# Patient Record
Sex: FEMALE | Race: WHITE | NOT HISPANIC OR LATINO | Employment: FULL TIME | URBAN - METROPOLITAN AREA
[De-identification: names, ages, dates, MRNs, and addresses within clinical notes are randomized per-mention and may not be internally consistent; named-entity substitution may affect disease eponyms.]

---

## 2022-02-18 DIAGNOSIS — N63.14 UNSPECIFIED LUMP IN THE RIGHT BREAST, LOWER INNER QUADRANT: Primary | ICD-10-CM

## 2022-02-18 DIAGNOSIS — N63.23 UNSPECIFIED LUMP IN THE LEFT BREAST, LOWER OUTER QUADRANT: ICD-10-CM

## 2022-02-18 DIAGNOSIS — N64.59 OTHER SIGNS AND SYMPTOMS IN BREAST: ICD-10-CM

## 2022-03-07 ENCOUNTER — HOSPITAL ENCOUNTER (OUTPATIENT)
Dept: RADIOLOGY | Facility: HOSPITAL | Age: 37
Discharge: HOME | End: 2022-03-07
Attending: OBSTETRICS & GYNECOLOGY
Payer: COMMERCIAL

## 2022-03-07 DIAGNOSIS — N63.23 UNSPECIFIED LUMP IN THE LEFT BREAST, LOWER OUTER QUADRANT: ICD-10-CM

## 2022-03-07 DIAGNOSIS — N63.14 UNSPECIFIED LUMP IN THE RIGHT BREAST, LOWER INNER QUADRANT: ICD-10-CM

## 2022-03-07 DIAGNOSIS — N64.59 OTHER SIGNS AND SYMPTOMS IN BREAST: ICD-10-CM

## 2022-03-07 PROCEDURE — 77062 BREAST TOMOSYNTHESIS BI: CPT | Mod: TC,PO

## 2022-03-07 PROCEDURE — 76642 ULTRASOUND BREAST LIMITED: CPT | Mod: TC,50,PO

## 2022-05-02 ENCOUNTER — OFFICE VISIT (OUTPATIENT)
Dept: PRIMARY CARE CLINIC | Facility: CLINIC | Age: 37
End: 2022-05-02
Payer: COMMERCIAL

## 2022-05-02 ENCOUNTER — TELEPHONE (OUTPATIENT)
Dept: PRIMARY CARE CLINIC | Facility: CLINIC | Age: 37
End: 2022-05-02
Payer: COMMERCIAL

## 2022-05-02 VITALS
WEIGHT: 131.38 LBS | BODY MASS INDEX: 26.48 KG/M2 | HEIGHT: 59 IN | HEART RATE: 92 BPM | SYSTOLIC BLOOD PRESSURE: 124 MMHG | DIASTOLIC BLOOD PRESSURE: 76 MMHG | RESPIRATION RATE: 18 BRPM | OXYGEN SATURATION: 100 %

## 2022-05-02 DIAGNOSIS — I10 ESSENTIAL HYPERTENSION, BENIGN: Primary | ICD-10-CM

## 2022-05-02 DIAGNOSIS — E28.2 PCOS (POLYCYSTIC OVARIAN SYNDROME): ICD-10-CM

## 2022-05-02 DIAGNOSIS — F17.200 VAPING NICOTINE DEPENDENCE, NON-TOBACCO PRODUCT: ICD-10-CM

## 2022-05-02 DIAGNOSIS — J30.2 SEASONAL ALLERGIES: ICD-10-CM

## 2022-05-02 DIAGNOSIS — F41.9 ANXIETY: ICD-10-CM

## 2022-05-02 DIAGNOSIS — Z23 NEED FOR VACCINATION: ICD-10-CM

## 2022-05-02 PROCEDURE — 1159F MED LIST DOCD IN RCRD: CPT | Mod: CPTII,S$GLB,, | Performed by: FAMILY MEDICINE

## 2022-05-02 PROCEDURE — 99204 OFFICE O/P NEW MOD 45 MIN: CPT | Mod: 25,S$GLB,, | Performed by: FAMILY MEDICINE

## 2022-05-02 PROCEDURE — 3008F PR BODY MASS INDEX (BMI) DOCUMENTED: ICD-10-PCS | Mod: CPTII,S$GLB,, | Performed by: FAMILY MEDICINE

## 2022-05-02 PROCEDURE — 1160F RVW MEDS BY RX/DR IN RCRD: CPT | Mod: CPTII,S$GLB,, | Performed by: FAMILY MEDICINE

## 2022-05-02 PROCEDURE — 99999 PR PBB SHADOW E&M-EST. PATIENT-LVL III: ICD-10-PCS | Mod: PBBFAC,,, | Performed by: FAMILY MEDICINE

## 2022-05-02 PROCEDURE — 1160F PR REVIEW ALL MEDS BY PRESCRIBER/CLIN PHARMACIST DOCUMENTED: ICD-10-PCS | Mod: CPTII,S$GLB,, | Performed by: FAMILY MEDICINE

## 2022-05-02 PROCEDURE — 4010F PR ACE/ARB THEARPY RXD/TAKEN: ICD-10-PCS | Mod: CPTII,S$GLB,, | Performed by: FAMILY MEDICINE

## 2022-05-02 PROCEDURE — 3008F BODY MASS INDEX DOCD: CPT | Mod: CPTII,S$GLB,, | Performed by: FAMILY MEDICINE

## 2022-05-02 PROCEDURE — 93010 ELECTROCARDIOGRAM REPORT: CPT | Mod: S$GLB,,, | Performed by: INTERNAL MEDICINE

## 2022-05-02 PROCEDURE — 93010 EKG 12-LEAD: ICD-10-PCS | Mod: S$GLB,,, | Performed by: INTERNAL MEDICINE

## 2022-05-02 PROCEDURE — 3078F PR MOST RECENT DIASTOLIC BLOOD PRESSURE < 80 MM HG: ICD-10-PCS | Mod: CPTII,S$GLB,, | Performed by: FAMILY MEDICINE

## 2022-05-02 PROCEDURE — 99204 PR OFFICE/OUTPT VISIT, NEW, LEVL IV, 45-59 MIN: ICD-10-PCS | Mod: 25,S$GLB,, | Performed by: FAMILY MEDICINE

## 2022-05-02 PROCEDURE — 4010F ACE/ARB THERAPY RXD/TAKEN: CPT | Mod: CPTII,S$GLB,, | Performed by: FAMILY MEDICINE

## 2022-05-02 PROCEDURE — 93005 EKG 12-LEAD: ICD-10-PCS | Mod: S$GLB,,, | Performed by: FAMILY MEDICINE

## 2022-05-02 PROCEDURE — 99999 PR PBB SHADOW E&M-EST. PATIENT-LVL III: CPT | Mod: PBBFAC,,, | Performed by: FAMILY MEDICINE

## 2022-05-02 PROCEDURE — 1159F PR MEDICATION LIST DOCUMENTED IN MEDICAL RECORD: ICD-10-PCS | Mod: CPTII,S$GLB,, | Performed by: FAMILY MEDICINE

## 2022-05-02 PROCEDURE — 3074F PR MOST RECENT SYSTOLIC BLOOD PRESSURE < 130 MM HG: ICD-10-PCS | Mod: CPTII,S$GLB,, | Performed by: FAMILY MEDICINE

## 2022-05-02 PROCEDURE — 93005 ELECTROCARDIOGRAM TRACING: CPT | Mod: S$GLB,,, | Performed by: FAMILY MEDICINE

## 2022-05-02 PROCEDURE — 90471 IMMUNIZATION ADMIN: CPT | Mod: S$GLB,,, | Performed by: FAMILY MEDICINE

## 2022-05-02 PROCEDURE — 90471 TDAP VACCINE GREATER THAN OR EQUAL TO 7YO IM: ICD-10-PCS | Mod: S$GLB,,, | Performed by: FAMILY MEDICINE

## 2022-05-02 PROCEDURE — 3078F DIAST BP <80 MM HG: CPT | Mod: CPTII,S$GLB,, | Performed by: FAMILY MEDICINE

## 2022-05-02 PROCEDURE — 90715 TDAP VACCINE GREATER THAN OR EQUAL TO 7YO IM: ICD-10-PCS | Mod: S$GLB,,, | Performed by: FAMILY MEDICINE

## 2022-05-02 PROCEDURE — 90715 TDAP VACCINE 7 YRS/> IM: CPT | Mod: S$GLB,,, | Performed by: FAMILY MEDICINE

## 2022-05-02 PROCEDURE — 3074F SYST BP LT 130 MM HG: CPT | Mod: CPTII,S$GLB,, | Performed by: FAMILY MEDICINE

## 2022-05-02 RX ORDER — ALPRAZOLAM 0.5 MG/1
0.5 TABLET ORAL DAILY PRN
COMMUNITY
Start: 2022-03-04

## 2022-05-02 RX ORDER — LISINOPRIL 5 MG/1
5 TABLET ORAL DAILY
Qty: 90 TABLET | Refills: 3 | Status: SHIPPED | OUTPATIENT
Start: 2022-05-02 | End: 2022-08-05

## 2022-05-02 RX ORDER — LISINOPRIL 5 MG/1
5 TABLET ORAL DAILY
COMMUNITY
Start: 2022-04-21 | End: 2022-05-02 | Stop reason: SDUPTHER

## 2022-05-02 NOTE — PROGRESS NOTES
Verified pt ID using name and . NKDA. Administered Tdap in left deltoid per physician order using aseptic technique. Aspirated and no blood return noted. Pt tolerated well with no adverse reactions noted.

## 2022-05-02 NOTE — PROGRESS NOTES
"Subjective:       Patient ID: Daniella Turner is a 37 y.o. female.    Chief Complaint: Establish Care and Hypertension    BP consistently elevated at GYN appointment earlier this year, so started on lisinopril and BP has been fine since. , uncomplicated pregnancy    Review of Systems   Constitutional: Negative for chills, fatigue and fever.   HENT: Negative for congestion.    Eyes: Negative for visual disturbance.   Respiratory: Negative for cough and shortness of breath.    Cardiovascular: Negative for chest pain.   Gastrointestinal: Negative for abdominal pain, nausea and vomiting.   Genitourinary: Negative for difficulty urinating.   Musculoskeletal: Negative for arthralgias.   Skin: Negative for rash.   Allergic/Immunologic: Positive for environmental allergies.   Neurological: Negative for dizziness.   Hematological: Does not bruise/bleed easily.   Psychiatric/Behavioral: Negative for sleep disturbance.       Objective:      Vitals:    22 1543   BP: 124/76   BP Location: Right arm   Patient Position: Sitting   BP Method: Medium (Manual)   Pulse: 92   Resp: 18   SpO2: 100%   Weight: 59.6 kg (131 lb 6.3 oz)   Height: 4' 11" (1.499 m)     Physical Exam  Vitals and nursing note reviewed.   Constitutional:       Appearance: She is well-developed.   HENT:      Head: Normocephalic and atraumatic.   Eyes:      Pupils: Pupils are equal, round, and reactive to light.   Neck:      Vascular: No carotid bruit or JVD.   Cardiovascular:      Rate and Rhythm: Normal rate and regular rhythm.      Pulses:           Radial pulses are 2+ on the right side and 2+ on the left side.      Heart sounds: Normal heart sounds.   Pulmonary:      Effort: Pulmonary effort is normal.      Breath sounds: Normal breath sounds.   Abdominal:      General: Bowel sounds are normal. There is no distension.      Palpations: Abdomen is soft.      Tenderness: There is no abdominal tenderness.   Musculoskeletal:      Cervical back: Neck " supple.      Right lower leg: No edema.      Left lower leg: No edema.   Skin:     General: Skin is warm and dry.   Neurological:      Mental Status: She is alert and oriented to person, place, and time.   Psychiatric:         Behavior: Behavior normal.         No results found for: WBC, HGB, HCT, PLT, CHOL, TRIG, HDL, LDLDIRECT, ALT, AST, NA, K, CL, CREATININE, BUN, CO2, TSH, PSA, INR, GLUF, HGBA1C, MICROALBUR   Assessment:       1. Essential hypertension, benign    2. Anxiety    3. Seasonal allergies    4. PCOS (polycystic ovarian syndrome)    5. Vaping nicotine dependence, non-tobacco product    6. Need for vaccination        Plan:       Essential hypertension, benign  -     EKG 12-lead  -     lisinopriL (PRINIVIL,ZESTRIL) 5 MG tablet; Take 1 tablet (5 mg total) by mouth once daily.  Dispense: 90 tablet; Refill: 3  EKG normal.  Continue current meds.  Release of information to get recent lab results from her OBGYN  Anxiety  Uses Xanax p.r.n.  Seasonal allergies    PCOS (polycystic ovarian syndrome)    Vaping nicotine dependence, non-tobacco product  About to get off completely  Need for vaccination  -     Tdap Vaccine      Medication List with Changes/Refills   Current Medications    ALPRAZOLAM (XANAX) 0.5 MG TABLET    0.5 mg daily as needed.   Changed and/or Refilled Medications    Modified Medication Previous Medication    LISINOPRIL (PRINIVIL,ZESTRIL) 5 MG TABLET lisinopriL (PRINIVIL,ZESTRIL) 5 MG tablet       Take 1 tablet (5 mg total) by mouth once daily.    Take 5 mg by mouth once daily.

## 2022-05-02 NOTE — TELEPHONE ENCOUNTER
SUBHASH sent to [facility] in [place] for patient's labs she did in March     Fax: [phone]

## 2022-08-05 ENCOUNTER — PATIENT MESSAGE (OUTPATIENT)
Dept: PRIMARY CARE CLINIC | Facility: CLINIC | Age: 37
End: 2022-08-05

## 2022-08-05 ENCOUNTER — OFFICE VISIT (OUTPATIENT)
Dept: PRIMARY CARE CLINIC | Facility: CLINIC | Age: 37
End: 2022-08-05
Payer: COMMERCIAL

## 2022-08-05 DIAGNOSIS — R05.8 COUGH DUE TO ACE INHIBITOR: Primary | ICD-10-CM

## 2022-08-05 DIAGNOSIS — T46.4X5A COUGH DUE TO ACE INHIBITOR: Primary | ICD-10-CM

## 2022-08-05 DIAGNOSIS — I10 ESSENTIAL HYPERTENSION, BENIGN: ICD-10-CM

## 2022-08-05 PROCEDURE — 4010F ACE/ARB THERAPY RXD/TAKEN: CPT | Mod: CPTII,95,, | Performed by: FAMILY MEDICINE

## 2022-08-05 PROCEDURE — 1160F PR REVIEW ALL MEDS BY PRESCRIBER/CLIN PHARMACIST DOCUMENTED: ICD-10-PCS | Mod: CPTII,95,, | Performed by: FAMILY MEDICINE

## 2022-08-05 PROCEDURE — 4010F PR ACE/ARB THEARPY RXD/TAKEN: ICD-10-PCS | Mod: CPTII,95,, | Performed by: FAMILY MEDICINE

## 2022-08-05 PROCEDURE — 1159F PR MEDICATION LIST DOCUMENTED IN MEDICAL RECORD: ICD-10-PCS | Mod: CPTII,95,, | Performed by: FAMILY MEDICINE

## 2022-08-05 PROCEDURE — 99214 OFFICE O/P EST MOD 30 MIN: CPT | Mod: 95,,, | Performed by: FAMILY MEDICINE

## 2022-08-05 PROCEDURE — 1159F MED LIST DOCD IN RCRD: CPT | Mod: CPTII,95,, | Performed by: FAMILY MEDICINE

## 2022-08-05 PROCEDURE — 1160F RVW MEDS BY RX/DR IN RCRD: CPT | Mod: CPTII,95,, | Performed by: FAMILY MEDICINE

## 2022-08-05 PROCEDURE — 99214 PR OFFICE/OUTPT VISIT, EST, LEVL IV, 30-39 MIN: ICD-10-PCS | Mod: 95,,, | Performed by: FAMILY MEDICINE

## 2022-08-05 RX ORDER — CETIRIZINE HYDROCHLORIDE 10 MG/1
10 TABLET ORAL DAILY
COMMUNITY

## 2022-08-05 RX ORDER — FLUTICASONE PROPIONATE 50 MCG
1 SPRAY, SUSPENSION (ML) NASAL 2 TIMES DAILY
COMMUNITY
Start: 2022-05-31

## 2022-08-05 RX ORDER — AMLODIPINE BESYLATE 2.5 MG/1
2.5 TABLET ORAL DAILY
Qty: 90 TABLET | Refills: 0 | Status: SHIPPED | OUTPATIENT
Start: 2022-08-05 | End: 2022-11-02

## 2022-08-05 RX ORDER — ALBUTEROL SULFATE 90 UG/1
AEROSOL, METERED RESPIRATORY (INHALATION)
COMMUNITY
Start: 2022-05-31 | End: 2023-10-31

## 2022-08-05 RX ORDER — AZELASTINE 1 MG/ML
1 SPRAY, METERED NASAL 2 TIMES DAILY
COMMUNITY
Start: 2022-05-31

## 2022-08-05 NOTE — PROGRESS NOTES
Subjective:       Patient ID: Daniella Turner is a 37 y.o. female.    Chief Complaint: No chief complaint on file.      Cough  This is a new problem. The current episode started more than 1 month ago (3 months). The problem has been unchanged. The problem occurs every few minutes. The cough is non-productive. Associated symptoms include headaches and postnasal drip (occasional). Pertinent negatives include no chest pain, chills, fever, hemoptysis, shortness of breath, weight loss or wheezing. Nothing aggravates the symptoms. She has tried OTC cough suppressant for the symptoms. The treatment provided moderate relief.     The patient location is: [place]  The chief complaint leading to consultation is: cough    Visit type: audiovisual    Face to Face time with patient: 8 min  13 minutes of total time spent on the encounter, which includes face to face time and non-face to face time preparing to see the patient (eg, review of tests), Obtaining and/or reviewing separately obtained history, Documenting clinical information in the electronic or other health record, Independently interpreting results (not separately reported) and communicating results to the patient/family/caregiver, or Care coordination (not separately reported).         Each patient to whom he or she provides medical services by telemedicine is:  (1) informed of the relationship between the physician and patient and the respective role of any other health care provider with respect to management of the patient; and (2) notified that he or she may decline to receive medical services by telemedicine and may withdraw from such care at any time.    Notes:    Review of Systems   Constitutional: Negative for chills, fever and weight loss.   HENT: Positive for postnasal drip (occasional).    Respiratory: Positive for cough. Negative for hemoptysis, shortness of breath and wheezing.    Cardiovascular: Negative for chest pain and palpitations.   Gastrointestinal:  Negative for abdominal pain.   Allergic/Immunologic: Negative for immunocompromised state.   Neurological: Positive for headaches.   Psychiatric/Behavioral: Negative for agitation and confusion.       Objective:      There were no vitals filed for this visit.  Physical Exam  Constitutional:       Appearance: She is well-developed.   Pulmonary:      Effort: No respiratory distress.   Neurological:      Mental Status: She is alert and oriented to person, place, and time.   Psychiatric:         Behavior: Behavior normal.         No results found for: WBC, HGB, HCT, PLT, CHOL, TRIG, HDL, LDLDIRECT, ALT, AST, NA, K, CL, CREATININE, BUN, CO2, TSH, PSA, INR, GLUF, HGBA1C, MICROALBUR   Assessment:       1. Cough due to ACE inhibitor    2. Essential hypertension, benign        Plan:       Cough due to ACE inhibitor  Switch to different antihypertensive  Essential hypertension, benign  -     amLODIPine (NORVASC) 2.5 MG tablet; Take 1 tablet (2.5 mg total) by mouth once daily.  Dispense: 90 tablet; Refill: 0  Monitor BP at home. Will message for update in a few weeks    Medication List with Changes/Refills   New Medications    AMLODIPINE (NORVASC) 2.5 MG TABLET    Take 1 tablet (2.5 mg total) by mouth once daily.   Current Medications    ALBUTEROL (PROVENTIL/VENTOLIN HFA) 90 MCG/ACTUATION INHALER    SMARTSI-2 Puff(s) Via Inhaler Every 4 Hours PRN    ALPRAZOLAM (XANAX) 0.5 MG TABLET    0.5 mg daily as needed.    AZELASTINE (ASTELIN) 137 MCG (0.1 %) NASAL SPRAY    1 spray 2 (two) times daily.    CETIRIZINE (ZYRTEC) 10 MG TABLET    Take 10 mg by mouth once daily.    FLUTICASONE PROPIONATE (FLONASE) 50 MCG/ACTUATION NASAL SPRAY    1 spray by Each Nostril route 2 (two) times daily.   Discontinued Medications    LISINOPRIL (PRINIVIL,ZESTRIL) 5 MG TABLET    Take 1 tablet (5 mg total) by mouth once daily.

## 2022-08-19 ENCOUNTER — PATIENT MESSAGE (OUTPATIENT)
Dept: PRIMARY CARE CLINIC | Facility: CLINIC | Age: 37
End: 2022-08-19
Payer: COMMERCIAL

## 2022-08-30 ENCOUNTER — E-VISIT (OUTPATIENT)
Dept: PRIMARY CARE CLINIC | Facility: CLINIC | Age: 37
End: 2022-08-30
Payer: COMMERCIAL

## 2022-08-30 ENCOUNTER — PATIENT MESSAGE (OUTPATIENT)
Dept: PRIMARY CARE CLINIC | Facility: CLINIC | Age: 37
End: 2022-08-30

## 2022-08-30 DIAGNOSIS — U07.1 COVID-19: Primary | ICD-10-CM

## 2022-08-30 PROCEDURE — 99421 PR E&M, ONLINE DIGIT, EST, < 7 DAYS, 5-10 MINS: ICD-10-PCS | Mod: S$GLB,,, | Performed by: FAMILY MEDICINE

## 2022-08-30 PROCEDURE — 99421 OL DIG E/M SVC 5-10 MIN: CPT | Mod: S$GLB,,, | Performed by: FAMILY MEDICINE

## 2022-08-30 NOTE — PROGRESS NOTES
Patient ID: Daniella Turner is a 37 y.o. female.    Chief Complaint: No chief complaint on file.    The patient initiated a request through [employer] on 8/30/2022 for evaluation and management with a chief complaint of fever     I evaluated the questionnaire submission on 8/30/22.    [facility] Peq Evisit Upper Respitatory/Cough Questionnaire    8/30/2022  4:09 PM CDT - Filed by Patient   Do you agree to participate in an E-Visit? Yes   If you have any of the following symptoms, please present to your local ER or call 911:  I acknowledge   What is the main issue that you would like for your doctor to address today? Positive at home covid test. Fever chills. Back pain   Are you able to take your vital signs? Yes   Systolic Blood Pressure: 145   Diastolic Blood Pressure: 97   Weight: 130   Height:    Pulse:    Temp: 100.2   Resp:    Pulse Ox:    What symptoms do you currently have?  Chills;  Headache;  Nasal Congestion;  Muscle or body aches   Have you had a fever? Yes   What has been the range of your fever? 100.4 or greater   When did your symptoms first appear? 8/29/2022   In the last two weeks, have you been in close contact with someone who has COVID-19? Yes   In the last two weeks, have you worked or volunteered in a healthcare facility or as a ? Healthcare facilities include a hospital, medical or dental clinic, long-term care facility, or nursing home No   Do you live in a long-term care facility, nursing home, or homeless shelter? No   List what you have done or taken to help your symptoms. Advil   How severe are your symptoms? Moderate   Have you taken an at home Covid test? Yes   What were the results? Positive   Have you been fully vaccinated for COVID? (2 Pfizer, 2 Moderna or 1 Rafal & Rafal vaccine injections) No   Have you received your first dose of the Pfizer or Moderna COVID vaccine? No   Do you have transportation to get tested for COVID if it is indicated and ordered for you at an [facility]  location? Yes   Provide any information you feel is important to your history not asked above    Please attach any relevant images or files         Active Problem List with Overview Notes    Diagnosis Date Noted    PCOS (polycystic ovarian syndrome) 05/02/2022    Essential hypertension, benign 05/02/2022    Anxiety 05/02/2022    Seasonal allergies 05/02/2022    Vaping nicotine dependence, non-tobacco product 05/02/2022      Recent Labs Obtained:  No visits with results within 7 Day(s) from this visit.   Latest known visit with results is:   No results found for any previous visit.   1     Encounter Diagnosis   Name Primary?    COVID-19 Yes        No orders of the defined types were placed in this encounter.     Medications Ordered This Encounter   Medications    nirmatrelvir-ritonavir 300 mg (150 mg x 2)-100 mg copackaged tablets (EUA)     Sig: Take 3 tablets by mouth 2 (two) times daily for 5 days. Each dose contains 2 nirmatrelvir (pink tablets) and 1 ritonavir (white tablet). Take all 3 tablets together     Dispense:  30 tablet     Refill:  0     Order Specific Question:   Per EUA criteria, patient has a positive COVID test AND symptom onset </= 5 days     Answer:   Yes        E-Visit Time Tracking:    Day 1 Time (in minutes): 6     Total Time (in minutes): 6

## 2022-08-31 DIAGNOSIS — I10 ESSENTIAL HYPERTENSION, BENIGN: ICD-10-CM

## 2022-09-27 ENCOUNTER — PATIENT MESSAGE (OUTPATIENT)
Dept: PRIMARY CARE CLINIC | Facility: CLINIC | Age: 37
End: 2022-09-27
Payer: COMMERCIAL

## 2022-12-16 ENCOUNTER — PATIENT MESSAGE (OUTPATIENT)
Dept: PRIMARY CARE CLINIC | Facility: CLINIC | Age: 37
End: 2022-12-16
Payer: COMMERCIAL

## 2023-04-30 RX ORDER — CEPHALEXIN 500 MG/1
500 CAPSULE ORAL EVERY 12 HOURS
Qty: 10 CAPSULE | Refills: 0 | Status: SHIPPED | OUTPATIENT
Start: 2023-04-30 | End: 2023-05-05

## 2023-10-17 ENCOUNTER — PATIENT OUTREACH (OUTPATIENT)
Dept: ADMINISTRATIVE | Facility: HOSPITAL | Age: 38
End: 2023-10-17
Payer: COMMERCIAL

## 2023-10-17 NOTE — PROGRESS NOTES
Health Maintenance Due   Topic Date Due    Hepatitis C Screening  Never done    Lipid Panel  Never done    COVID-19 Vaccine (1) Never done    Pneumococcal Vaccines (Age 0-64) (1 - PCV) Never done    HIV Screening  Never done    Hemoglobin A1c (Diabetic Prevention Screening)  Never done    Influenza Vaccine (1) Never done        Chart review done.   HM updated.   Immunizations reviewed & updated.   Care Everywhere updated.

## 2023-10-31 ENCOUNTER — OFFICE VISIT (OUTPATIENT)
Dept: PRIMARY CARE CLINIC | Facility: CLINIC | Age: 38
End: 2023-10-31
Payer: COMMERCIAL

## 2023-10-31 VITALS
WEIGHT: 136.69 LBS | DIASTOLIC BLOOD PRESSURE: 86 MMHG | SYSTOLIC BLOOD PRESSURE: 136 MMHG | OXYGEN SATURATION: 97 % | TEMPERATURE: 97 F | RESPIRATION RATE: 18 BRPM | HEART RATE: 98 BPM | HEIGHT: 59 IN | BODY MASS INDEX: 27.56 KG/M2

## 2023-10-31 DIAGNOSIS — E28.2 PCOS (POLYCYSTIC OVARIAN SYNDROME): ICD-10-CM

## 2023-10-31 DIAGNOSIS — Z13.6 ENCOUNTER FOR SCREENING FOR CARDIOVASCULAR DISORDERS: ICD-10-CM

## 2023-10-31 DIAGNOSIS — F51.01 PRIMARY INSOMNIA: ICD-10-CM

## 2023-10-31 DIAGNOSIS — I10 ESSENTIAL HYPERTENSION, BENIGN: ICD-10-CM

## 2023-10-31 DIAGNOSIS — Z00.00 ANNUAL PHYSICAL EXAM: Primary | ICD-10-CM

## 2023-10-31 DIAGNOSIS — Z11.4 SCREENING FOR HIV (HUMAN IMMUNODEFICIENCY VIRUS): ICD-10-CM

## 2023-10-31 DIAGNOSIS — Z11.59 NEED FOR HEPATITIS C SCREENING TEST: ICD-10-CM

## 2023-10-31 DIAGNOSIS — Z13.1 SCREENING FOR DIABETES MELLITUS: ICD-10-CM

## 2023-10-31 PROCEDURE — 1159F MED LIST DOCD IN RCRD: CPT | Mod: CPTII,S$GLB,, | Performed by: FAMILY MEDICINE

## 2023-10-31 PROCEDURE — 99395 PREV VISIT EST AGE 18-39: CPT | Mod: S$GLB,,, | Performed by: FAMILY MEDICINE

## 2023-10-31 PROCEDURE — 99999 PR PBB SHADOW E&M-EST. PATIENT-LVL IV: ICD-10-PCS | Mod: PBBFAC,,, | Performed by: FAMILY MEDICINE

## 2023-10-31 PROCEDURE — 3008F BODY MASS INDEX DOCD: CPT | Mod: CPTII,S$GLB,, | Performed by: FAMILY MEDICINE

## 2023-10-31 PROCEDURE — 3079F DIAST BP 80-89 MM HG: CPT | Mod: CPTII,S$GLB,, | Performed by: FAMILY MEDICINE

## 2023-10-31 PROCEDURE — 1160F PR REVIEW ALL MEDS BY PRESCRIBER/CLIN PHARMACIST DOCUMENTED: ICD-10-PCS | Mod: CPTII,S$GLB,, | Performed by: FAMILY MEDICINE

## 2023-10-31 PROCEDURE — 3079F PR MOST RECENT DIASTOLIC BLOOD PRESSURE 80-89 MM HG: ICD-10-PCS | Mod: CPTII,S$GLB,, | Performed by: FAMILY MEDICINE

## 2023-10-31 PROCEDURE — 1160F RVW MEDS BY RX/DR IN RCRD: CPT | Mod: CPTII,S$GLB,, | Performed by: FAMILY MEDICINE

## 2023-10-31 PROCEDURE — 3075F SYST BP GE 130 - 139MM HG: CPT | Mod: CPTII,S$GLB,, | Performed by: FAMILY MEDICINE

## 2023-10-31 PROCEDURE — 3008F PR BODY MASS INDEX (BMI) DOCUMENTED: ICD-10-PCS | Mod: CPTII,S$GLB,, | Performed by: FAMILY MEDICINE

## 2023-10-31 PROCEDURE — 1159F PR MEDICATION LIST DOCUMENTED IN MEDICAL RECORD: ICD-10-PCS | Mod: CPTII,S$GLB,, | Performed by: FAMILY MEDICINE

## 2023-10-31 PROCEDURE — 99999 PR PBB SHADOW E&M-EST. PATIENT-LVL IV: CPT | Mod: PBBFAC,,, | Performed by: FAMILY MEDICINE

## 2023-10-31 PROCEDURE — 99395 PR PREVENTIVE VISIT,EST,18-39: ICD-10-PCS | Mod: S$GLB,,, | Performed by: FAMILY MEDICINE

## 2023-10-31 PROCEDURE — 3075F PR MOST RECENT SYSTOLIC BLOOD PRESS GE 130-139MM HG: ICD-10-PCS | Mod: CPTII,S$GLB,, | Performed by: FAMILY MEDICINE

## 2023-10-31 RX ORDER — TRAZODONE HYDROCHLORIDE 50 MG/1
50-100 TABLET ORAL NIGHTLY PRN
Qty: 60 TABLET | Refills: 5 | Status: SHIPPED | OUTPATIENT
Start: 2023-10-31 | End: 2024-02-28

## 2023-10-31 RX ORDER — AMLODIPINE BESYLATE 2.5 MG/1
2.5 TABLET ORAL DAILY
Qty: 90 TABLET | Refills: 3 | Status: SHIPPED | OUTPATIENT
Start: 2023-10-31 | End: 2024-02-23

## 2023-10-31 NOTE — PROGRESS NOTES
"Subjective:       Patient ID: Daniella Turner is a 38 y.o. female.    Chief Complaint: Annual Exam    Daniella Turner is a 38 y.o. female seen today for a routine checkup.  Lengthy history of chronic insomnia.  Has tried melatonin improvement.  Only regimen that helped is taking 3 tablets of Benadryl nightly.  No recent illness or injury.  Compliant with all prescribed medications without adverse side effects.       Review of Systems   Constitutional:  Negative for chills, fatigue and fever.   HENT:  Negative for congestion.    Eyes:  Negative for visual disturbance.   Respiratory:  Negative for cough and shortness of breath.    Cardiovascular:  Negative for chest pain.   Gastrointestinal:  Negative for abdominal pain, nausea and vomiting.   Genitourinary:  Positive for pelvic pain. Negative for difficulty urinating, vaginal bleeding and vaginal discharge.   Musculoskeletal:  Negative for arthralgias.   Skin:  Negative for rash.   Allergic/Immunologic: Positive for environmental allergies.   Neurological:  Negative for dizziness.   Psychiatric/Behavioral:  Positive for sleep disturbance.        Objective:      Vitals:    10/31/23 1408 10/31/23 1426   BP: (!) 148/90 136/86   BP Location: Left arm Left arm   Patient Position: Sitting Sitting   BP Method: Medium (Manual) Medium (Manual)   Pulse: 98    Resp: 18    Temp: 97.3 °F (36.3 °C)    TempSrc: Temporal    SpO2: 97%    Weight: 62 kg (136 lb 11 oz)    Height: 4' 11" (1.499 m)      BP Readings from Last 5 Encounters:   10/31/23 136/86   05/02/22 124/76     Wt Readings from Last 5 Encounters:   10/31/23 62 kg (136 lb 11 oz)   05/02/22 59.6 kg (131 lb 6.3 oz)     Physical Exam  Vitals and nursing note reviewed.   Constitutional:       General: She is not in acute distress.     Appearance: Normal appearance. She is well-developed.   HENT:      Head: Normocephalic and atraumatic.   Neck:      Vascular: No carotid bruit.   Cardiovascular:      Rate and Rhythm: Normal rate " "and regular rhythm.      Heart sounds: Normal heart sounds.   Pulmonary:      Effort: Pulmonary effort is normal.      Breath sounds: Normal breath sounds.   Abdominal:      General: There is no distension.      Tenderness: There is no abdominal tenderness.   Musculoskeletal:      Right lower leg: No edema.      Left lower leg: No edema.   Skin:     General: Skin is warm and dry.   Neurological:      Mental Status: She is alert and oriented to person, place, and time.   Psychiatric:         Mood and Affect: Mood normal.         Behavior: Behavior normal.         No results found for: "WBC", "HGB", "HCT", "PLT", "CHOL", "TRIG", "HDL", "LDLDIRECT", "ALT", "AST", "NA", "K", "CL", "CREATININE", "BUN", "CO2", "TSH", "PSA", "INR", "GLUF", "HGBA1C", "MICROALBUR"   Assessment:       1. Annual physical exam    2. PCOS (polycystic ovarian syndrome)    3. Essential hypertension, benign    4. Screening for diabetes mellitus    5. Need for hepatitis C screening test    6. Screening for HIV (human immunodeficiency virus)    7. Encounter for screening for cardiovascular disorders    8. Primary insomnia        Plan:       Annual physical exam  -     CBC Auto Differential; Future; Expected date: 10/31/2023  -     Comprehensive Metabolic Panel; Future; Expected date: 10/31/2023  -     Lipid Panel; Future; Expected date: 10/31/2023  -     Hemoglobin A1C; Future; Expected date: 10/31/2023  -     TSH; Future; Expected date: 10/31/2023  -     Hepatitis C Antibody; Future; Expected date: 10/31/2023  -     HIV 1/2 Ag/Ab (4th Gen); Future; Expected date: 10/31/2023  -     ESTRADIOL; Future; Expected date: 10/31/2023  -     Follicle Stimulating Hormone; Future; Expected date: 10/31/2023  -     Luteinizing Hormone; Future; Expected date: 10/31/2023    PCOS (polycystic ovarian syndrome)  -     Hemoglobin A1C; Future; Expected date: 10/31/2023  -     TSH; Future; Expected date: 10/31/2023  -     Ambulatory referral/consult to Obstetrics / " Gynecology; Future; Expected date: 11/07/2023  -     ESTRADIOL; Future; Expected date: 10/31/2023  -     Follicle Stimulating Hormone; Future; Expected date: 10/31/2023  -     Luteinizing Hormone; Future; Expected date: 10/31/2023    Essential hypertension, benign  -     CBC Auto Differential; Future; Expected date: 10/31/2023  -     Comprehensive Metabolic Panel; Future; Expected date: 10/31/2023  -     amLODIPine (NORVASC) 2.5 MG tablet; Take 1 tablet (2.5 mg total) by mouth once daily.  Dispense: 90 tablet; Refill: 3    Screening for diabetes mellitus  -     Hemoglobin A1C; Future; Expected date: 10/31/2023    Need for hepatitis C screening test  -     Hepatitis C Antibody; Future; Expected date: 10/31/2023    Screening for HIV (human immunodeficiency virus)  -     HIV 1/2 Ag/Ab (4th Gen); Future; Expected date: 10/31/2023    Encounter for screening for cardiovascular disorders  -     Lipid Panel; Future; Expected date: 10/31/2023    Primary insomnia  -     traZODone (DESYREL) 50 MG tablet; Take 1-2 tablets ( mg total) by mouth nightly as needed for Insomnia.  Dispense: 60 tablet; Refill: 5      Medication List with Changes/Refills   New Medications    TRAZODONE (DESYREL) 50 MG TABLET    Take 1-2 tablets ( mg total) by mouth nightly as needed for Insomnia.   Current Medications    ALPRAZOLAM (XANAX) 0.5 MG TABLET    0.5 mg daily as needed.    AZELASTINE (ASTELIN) 137 MCG (0.1 %) NASAL SPRAY    1 spray 2 (two) times daily.    CETIRIZINE (ZYRTEC) 10 MG TABLET    Take 10 mg by mouth once daily.    FLUTICASONE PROPIONATE (FLONASE) 50 MCG/ACTUATION NASAL SPRAY    1 spray by Each Nostril route 2 (two) times daily.   Changed and/or Refilled Medications    Modified Medication Previous Medication    AMLODIPINE (NORVASC) 2.5 MG TABLET amLODIPine (NORVASC) 2.5 MG tablet       Take 1 tablet (2.5 mg total) by mouth once daily.    TAKE 1 TABLET BY MOUTH EVERY DAY   Discontinued Medications    ALBUTEROL  (PROVENTIL/VENTOLIN HFA) 90 MCG/ACTUATION INHALER    SMARTSI-2 Puff(s) Via Inhaler Every 4 Hours PRN

## 2023-11-12 PROBLEM — E03.8 SUBCLINICAL HYPOTHYROIDISM: Status: ACTIVE | Noted: 2023-11-12

## 2024-02-22 ENCOUNTER — OFFICE VISIT (OUTPATIENT)
Dept: OBSTETRICS AND GYNECOLOGY | Facility: CLINIC | Age: 39
End: 2024-02-22
Payer: COMMERCIAL

## 2024-02-22 VITALS
BODY MASS INDEX: 27.49 KG/M2 | DIASTOLIC BLOOD PRESSURE: 103 MMHG | HEART RATE: 94 BPM | SYSTOLIC BLOOD PRESSURE: 145 MMHG | HEIGHT: 59 IN | WEIGHT: 136.38 LBS

## 2024-02-22 DIAGNOSIS — E28.2 PCOS (POLYCYSTIC OVARIAN SYNDROME): ICD-10-CM

## 2024-02-22 DIAGNOSIS — R10.2 CYCLICAL PELVIC PAIN: Primary | ICD-10-CM

## 2024-02-22 PROCEDURE — 1160F RVW MEDS BY RX/DR IN RCRD: CPT | Mod: CPTII,S$GLB,, | Performed by: NURSE PRACTITIONER

## 2024-02-22 PROCEDURE — 1159F MED LIST DOCD IN RCRD: CPT | Mod: CPTII,S$GLB,, | Performed by: NURSE PRACTITIONER

## 2024-02-22 PROCEDURE — 3080F DIAST BP >= 90 MM HG: CPT | Mod: CPTII,S$GLB,, | Performed by: NURSE PRACTITIONER

## 2024-02-22 PROCEDURE — 3008F BODY MASS INDEX DOCD: CPT | Mod: CPTII,S$GLB,, | Performed by: NURSE PRACTITIONER

## 2024-02-22 PROCEDURE — 99203 OFFICE O/P NEW LOW 30 MIN: CPT | Mod: S$GLB,,, | Performed by: NURSE PRACTITIONER

## 2024-02-22 PROCEDURE — 99999 PR PBB SHADOW E&M-EST. PATIENT-LVL IV: CPT | Mod: PBBFAC,,, | Performed by: NURSE PRACTITIONER

## 2024-02-22 PROCEDURE — 3077F SYST BP >= 140 MM HG: CPT | Mod: CPTII,S$GLB,, | Performed by: NURSE PRACTITIONER

## 2024-02-22 NOTE — PROGRESS NOTES
"CC: PCOS    HPI: Pt is a 39 y.o.  female who presents to discuss symptoms of "PCOS." She reports feeling cysts rupture each month causing cyclic low back and deep pelvic pain. Pain starts as soreness, intensifies to 10/10 at times, then she is tender for a few days after. She reports dark brown vaginal discharge like "old blood" for about 2 days after severe pain. This pain has been occurring intermittently for 2 years but has recently been monthly.     Patient had TLH with ovarian conservation 5 years ago for heavy irregular period and pelvic pain. Reports history of PCOS and endometriosis.        ROS:  GENERAL: Feeling well overall. Denies fever or chills.   ABDOMEN: No abdominal pain, constipation, diarrhea, nausea, vomiting or rectal bleeding.   URINARY: No dysuria, hematuria, or frequency  REPRODUCTIVE: See HPI.   PSYCHIATRIC: Denies depression, anxiety or mood swings.    PE:   APPEARANCE: Well nourished, well developed, White female in no acute distress.  PELVIC: Deferred     Diagnosis:  1. Cyclical pelvic pain    2. PCOS (polycystic ovarian syndrome)        Plan:     Orders Placed This Encounter    US Pelvis Comp with Transvag NON-OB (xpd     Discussed possible endometriosis with endometrioma at vaginal cuff  Will get US and refer to MD for possible sx consult.         "

## 2024-02-23 ENCOUNTER — PATIENT MESSAGE (OUTPATIENT)
Dept: PRIMARY CARE CLINIC | Facility: CLINIC | Age: 39
End: 2024-02-23
Payer: COMMERCIAL

## 2024-02-23 ENCOUNTER — TELEPHONE (OUTPATIENT)
Dept: OBSTETRICS AND GYNECOLOGY | Facility: CLINIC | Age: 39
End: 2024-02-23
Payer: COMMERCIAL

## 2024-02-23 DIAGNOSIS — R00.2 PALPITATIONS: ICD-10-CM

## 2024-02-23 DIAGNOSIS — E03.8 SUBCLINICAL HYPOTHYROIDISM: ICD-10-CM

## 2024-02-23 DIAGNOSIS — I10 ESSENTIAL HYPERTENSION, BENIGN: Primary | ICD-10-CM

## 2024-02-23 RX ORDER — AMLODIPINE BESYLATE 5 MG/1
5 TABLET ORAL DAILY
Qty: 30 TABLET | Refills: 11 | Status: SHIPPED | OUTPATIENT
Start: 2024-02-23 | End: 2025-02-22

## 2024-02-23 NOTE — TELEPHONE ENCOUNTER
Called to discuss US. Will facilitate follow up with Dr. Rodrigues. All questions answered to patient satisfaction. YESENIA.

## 2024-02-26 ENCOUNTER — TELEPHONE (OUTPATIENT)
Dept: OBSTETRICS AND GYNECOLOGY | Facility: CLINIC | Age: 39
End: 2024-02-26
Payer: COMMERCIAL

## 2024-02-26 NOTE — TELEPHONE ENCOUNTER
----- Message from Sarina Rodrigues MD sent at 2/26/2024 12:01 PM CST -----  Regarding: FW: Endo patient  Offer her an appt this Wednesday morning. If she cannot do that then sometime in the next month or so is fine.   -Dr. Rodrigues    Appt for consult pelvic pain  ----- Message -----  From: Iveth Marcos, NP-C  Sent: 2/23/2024   8:48 AM CST  To: Sarina Rodrigues MD; [facility] Staff  Subject: Endo patient                                     Hey Dr. Rodrigues,     This is the patient that I told you about. Pelvic US is completed.    Staff,  Please schedule this patient for a consult with Dr. Rodrigues.    Thanks!  Iveth      2/26/24 @ 6071 Called pt appointment scheduled per Dr Rodrigues Wed 2/28 at 1015. Location verified. Pt verbalizes understanding.

## 2024-02-27 NOTE — TELEPHONE ENCOUNTER
Continue current dose of amlodipine for now.  Please have patient get EKG, echo, Holter monitor and TSH level, then schedule a follow-up telemedicine visit with me in the next couple weeks

## 2024-02-28 ENCOUNTER — OFFICE VISIT (OUTPATIENT)
Dept: OBSTETRICS AND GYNECOLOGY | Facility: CLINIC | Age: 39
End: 2024-02-28
Attending: OBSTETRICS & GYNECOLOGY
Payer: COMMERCIAL

## 2024-02-28 VITALS — DIASTOLIC BLOOD PRESSURE: 94 MMHG | SYSTOLIC BLOOD PRESSURE: 135 MMHG

## 2024-02-28 DIAGNOSIS — N80.9 ENDOMETRIOSIS: Primary | ICD-10-CM

## 2024-02-28 PROCEDURE — 1159F MED LIST DOCD IN RCRD: CPT | Mod: CPTII,S$GLB,, | Performed by: OBSTETRICS & GYNECOLOGY

## 2024-02-28 PROCEDURE — 3080F DIAST BP >= 90 MM HG: CPT | Mod: CPTII,S$GLB,, | Performed by: OBSTETRICS & GYNECOLOGY

## 2024-02-28 PROCEDURE — 99214 OFFICE O/P EST MOD 30 MIN: CPT | Mod: S$GLB,,, | Performed by: OBSTETRICS & GYNECOLOGY

## 2024-02-28 PROCEDURE — 99999 PR PBB SHADOW E&M-EST. PATIENT-LVL III: CPT | Mod: PBBFAC,,, | Performed by: OBSTETRICS & GYNECOLOGY

## 2024-02-28 PROCEDURE — 3075F SYST BP GE 130 - 139MM HG: CPT | Mod: CPTII,S$GLB,, | Performed by: OBSTETRICS & GYNECOLOGY

## 2024-02-28 RX ORDER — NORETHINDRONE 0.35 MG/1
1 TABLET ORAL DAILY
Qty: 90 TABLET | Refills: 3 | Status: SHIPPED | OUTPATIENT
Start: 2024-02-28 | End: 2025-02-27

## 2024-02-28 NOTE — PROGRESS NOTES
Subjective:       Patient ID: Daniella Turner is a 39 y.o. female.    Chief Complaint:  Consult        History of Present Illness  Daniella Turner is a 39 y.o. female  who presents for surgical consult. Referred by Iveth Marcos NP. Her PGYNHx is signficant for have a TLH 5 years ago for heavy painful periods with a known dx of endometriosis. She was attempting to have another baby and during that workup discovered she had endometriosis and that her partner had Azospermia. Decided to proceed with TLH and not remove ovaries. She did fine for a couple of years and then recently had been getting cyclic pain that starts in her back and then radiates to her abdomen. Then when she gets the pain she will have small amount of vaginal bleeding that is dark. And then after a day or so the pain goes away. Sometimes has bleeding with sex. Discussed in detail. Of note she is having a cardiac workup soon for palpitations. Her previous GYN said when she did the hysterectomy that she did have endo that she did not remove because it was close to her urinary tract. Discussed in detail with patient.     No LMP recorded. Patient has had a hysterectomy.   Date of Last Pap: No result found    Review of Systems  Review of Systems     Objective:   Physical Exam:   Constitutional: She appears well-developed and well-nourished.               Genitourinary:    Right adnexa and left adnexa normal.   Right adnexum displays no tenderness and no fullness. Left adnexum displays no tenderness and no fullness.    Genitourinary Comments: Small 8 mm pedunculated mass, mobile, looks c/w a polyp vs endometrioma vs scar tissue vs fibroid. Some tenderness to touch. At the left angle of the cuff.                         Assessment/ Plan:     1. Endometriosis  norethindrone (MICRONOR) 0.35 mg tablet        Plan for POP for cycle control to perhaps decrease ovulation and cyst formation  Plan for excision of vaginal lesion  I recommend either we give the  OCP some time to help with cyclic pain and if not better then do dx scope and remove vaginal lesion  OR we can just remove vaginal lesion. Pt will think about it and let me know    No follow-ups on file.    As of April 1, 2021, the Cures Act has been passed nationally. This new law requires that all doctors progress notes, lab results, pathology reports and radiology reports be released IMMEDIATELY to the patient in the patient portal. That means that the results are released to you at the EXACT same time they are released to me. Therefore, with all of the patients that I have I am not able to reply to each patient exactly when the results come in. So there will be a delay from when you see the results to when I see them and have time to come up with a response to send you. Also I only see these results when I am on the computer at work. So if the results come in over the weekend or after 5 pm of a work day, I will not see them until the next business day. As you can tell, this is a challenge as a physician to give every patient the quick response they hope for and deserve. So please be patient! Thanks for understanding, Dr. Rodrigues

## 2024-03-08 ENCOUNTER — CLINICAL SUPPORT (OUTPATIENT)
Dept: PRIMARY CARE CLINIC | Facility: CLINIC | Age: 39
End: 2024-03-08
Payer: COMMERCIAL

## 2024-03-08 VITALS
SYSTOLIC BLOOD PRESSURE: 124 MMHG | DIASTOLIC BLOOD PRESSURE: 86 MMHG | OXYGEN SATURATION: 98 % | HEART RATE: 92 BPM | TEMPERATURE: 99 F | RESPIRATION RATE: 18 BRPM

## 2024-03-08 DIAGNOSIS — I10 ESSENTIAL HYPERTENSION, BENIGN: Primary | ICD-10-CM

## 2024-03-08 PROCEDURE — 99999 PR PBB SHADOW E&M-EST. PATIENT-LVL II: CPT | Mod: PBBFAC,,,

## 2024-03-08 NOTE — PROGRESS NOTES
Pt arrived in clinic for blood pressure check. No chief complaints or pain reported. Pt's vitals as follows: /86, P 92, R 18, T 98.5, and O2 saturation 98%. Blood pressure within normal range, no repeat blood pressure check needed. Pt verbalized understanding. MD notified.

## 2024-03-20 ENCOUNTER — OFFICE VISIT (OUTPATIENT)
Dept: PRIMARY CARE CLINIC | Facility: CLINIC | Age: 39
End: 2024-03-20
Payer: COMMERCIAL

## 2024-03-20 ENCOUNTER — PATIENT MESSAGE (OUTPATIENT)
Dept: PRIMARY CARE CLINIC | Facility: CLINIC | Age: 39
End: 2024-03-20

## 2024-03-20 DIAGNOSIS — R00.0 CHRONIC TACHYCARDIA: ICD-10-CM

## 2024-03-20 DIAGNOSIS — E03.8 SUBCLINICAL HYPOTHYROIDISM: ICD-10-CM

## 2024-03-20 DIAGNOSIS — I10 ESSENTIAL HYPERTENSION, BENIGN: Primary | ICD-10-CM

## 2024-03-20 DIAGNOSIS — Z12.31 ENCOUNTER FOR SCREENING MAMMOGRAM FOR BREAST CANCER: ICD-10-CM

## 2024-03-20 PROCEDURE — 1160F RVW MEDS BY RX/DR IN RCRD: CPT | Mod: CPTII,95,, | Performed by: FAMILY MEDICINE

## 2024-03-20 PROCEDURE — 99214 OFFICE O/P EST MOD 30 MIN: CPT | Mod: 95,,, | Performed by: FAMILY MEDICINE

## 2024-03-20 PROCEDURE — 1159F MED LIST DOCD IN RCRD: CPT | Mod: CPTII,95,, | Performed by: FAMILY MEDICINE

## 2024-03-20 RX ORDER — METOPROLOL SUCCINATE 25 MG/1
25 TABLET, EXTENDED RELEASE ORAL DAILY
Qty: 90 TABLET | Refills: 3 | Status: SHIPPED | OUTPATIENT
Start: 2024-03-20

## 2024-03-20 NOTE — PROGRESS NOTES
Subjective:       Patient ID: Daniella Turner is a 39 y.o. female.    Chief Complaint: No chief complaint on file.      Recent workup fairly unremarkable.  TSH slightly elevated, but normal free T4 consistent known diagnosis of subclinical hypothyroidism.  Echocardiogram, Holter and EKG were unremarkable.  Home blood pressure cuff continues to read slightly elevated, although her blood pressure was normal in the primary care clinic.  Remains with borderline chronic tachycardia with resting heart rate from the low 90s to 105.  Gets occasional palpitations, particularly at night, makes her feel anxious.  Her daughter has a similar condition for which she has been prescribed a low-dose beta-blocker.  Anticipates having a gyn surgical procedure at some point later this year, probably in the summer, due to extension of endometriosis into the vaginal canal.  Has never had any prior surgical complications.  Will need clearance prior to proceeding        The patient location is: [place]  The chief complaint leading to consultation is: test results, BP, elevated pulse    Visit type: audiovisual    Face to Face time with patient: 9 minutes  16 minutes of total time spent on the encounter, which includes face to face time and non-face to face time preparing to see the patient (eg, review of tests), Obtaining and/or reviewing separately obtained history, Documenting clinical information in the electronic or other health record, Independently interpreting results (not separately reported) and communicating results to the patient/family/caregiver, or Care coordination (not separately reported).         Each patient to whom he or she provides medical services by telemedicine is:  (1) informed of the relationship between the physician and patient and the respective role of any other health care provider with respect to management of the patient; and (2) notified that he or she may decline to receive medical services by telemedicine and  may withdraw from such care at any time.    Notes:    Review of Systems   Cardiovascular:  Positive for palpitations. Negative for chest pain.   Neurological:  Negative for dizziness.   Psychiatric/Behavioral:  Negative for agitation and confusion. The patient is nervous/anxious.        Objective:      There were no vitals filed for this visit.  Physical Exam  Constitutional:       General: She is not in acute distress.     Appearance: Normal appearance. She is well-developed.   Pulmonary:      Effort: No respiratory distress.   Neurological:      Mental Status: She is alert and oriented to person, place, and time.   Psychiatric:         Behavior: Behavior normal.         Lab Results   Component Value Date    WBC 5.65 11/07/2023    HGB 14.1 11/07/2023    HCT 40.7 11/07/2023     11/07/2023    CHOL 258 (H) 11/07/2023    TRIG 86 11/07/2023    HDL 57 11/07/2023    ALT 13 11/07/2023    AST 17 11/07/2023     11/07/2023    K 3.6 11/07/2023     11/07/2023    CREATININE 0.8 11/07/2023    BUN 13 11/07/2023    CO2 26 11/07/2023    TSH 5.840 (H) 02/28/2024    HGBA1C 4.6 11/07/2023      Assessment:       1. Essential hypertension, benign    2. Subclinical hypothyroidism    3. Chronic tachycardia    4. Encounter for screening mammogram for breast cancer        Plan:       Essential hypertension, benign  -     metoprolol succinate (TOPROL-XL) 25 MG 24 hr tablet; Take 1 tablet (25 mg total) by mouth once daily.  Dispense: 90 tablet; Refill: 3  Continue amlodipine, and add low-dose metoprolol.  Will message for update in 1 week  Subclinical hypothyroidism  No treatment necessary, continue to monitor TSH annually  Chronic tachycardia  -     metoprolol succinate (TOPROL-XL) 25 MG 24 hr tablet; Take 1 tablet (25 mg total) by mouth once daily.  Dispense: 90 tablet; Refill: 3    Encounter for screening mammogram for breast cancer  -     Mammo Digital Screening Bilat w/ Clay; Future; Expected date:  03/20/2024      Medication List with Changes/Refills   New Medications    METOPROLOL SUCCINATE (TOPROL-XL) 25 MG 24 HR TABLET    Take 1 tablet (25 mg total) by mouth once daily.   Current Medications    ALPRAZOLAM (XANAX) 0.5 MG TABLET    0.5 mg daily as needed.    AMLODIPINE (NORVASC) 5 MG TABLET    Take 1 tablet (5 mg total) by mouth once daily.    AZELASTINE (ASTELIN) 137 MCG (0.1 %) NASAL SPRAY    1 spray 2 (two) times daily.    CETIRIZINE (ZYRTEC) 10 MG TABLET    Take 10 mg by mouth once daily.    FLUTICASONE PROPIONATE (FLONASE) 50 MCG/ACTUATION NASAL SPRAY    1 spray by Each Nostril route 2 (two) times daily.    NORETHINDRONE (MICRONOR) 0.35 MG TABLET    Take 1 tablet (0.35 mg total) by mouth once daily.

## 2024-04-09 ENCOUNTER — PATIENT MESSAGE (OUTPATIENT)
Dept: OBSTETRICS AND GYNECOLOGY | Facility: CLINIC | Age: 39
End: 2024-04-09
Payer: COMMERCIAL

## 2024-04-09 DIAGNOSIS — N80.9 ENDOMETRIOSIS DETERMINED BY LAPAROSCOPY: Primary | ICD-10-CM

## 2024-04-09 DIAGNOSIS — N89.8 VAGINAL LESION: ICD-10-CM

## 2024-04-14 NOTE — TELEPHONE ENCOUNTER
Requested Date:  June 20    Requested Time: 7 am  Case Length:120 minutes    Surgeon: Sarina Rodrigues      Assistant Surgeon: Iveth Marcos   Visit Type: Outpatient    LOCATION: [facility]    PROCEDURE:Laparoscopic excision of endometriosis, excision of vaginal lesion  Diagnosis:endometriosis  Anesthesia type: General   Comments/Special Equipment Needed:  Rep needed: No  Does the patient need a PreOp appointment with MD: Yes  U/S needed at pre-op: No  PCP clearance: Obtained and in Chart  When does she need her Post op appointment: 2 weeks in person  Do you need additional time blocked out from clinic? Yes  If so, what time do you want to be back in clinic? Out all day for surgery  When can the patient go back to work? two weeks

## 2024-05-01 ENCOUNTER — HOSPITAL ENCOUNTER (EMERGENCY)
Facility: HOSPITAL | Age: 39
Discharge: HOME | End: 2024-05-01
Attending: EMERGENCY MEDICINE
Payer: COMMERCIAL

## 2024-05-01 ENCOUNTER — E-VISIT (OUTPATIENT)
Dept: PRIMARY CARE CLINIC | Facility: CLINIC | Age: 39
End: 2024-05-01
Payer: COMMERCIAL

## 2024-05-01 VITALS
RESPIRATION RATE: 16 BRPM | BODY MASS INDEX: 27.06 KG/M2 | OXYGEN SATURATION: 99 % | DIASTOLIC BLOOD PRESSURE: 87 MMHG | SYSTOLIC BLOOD PRESSURE: 159 MMHG | WEIGHT: 134 LBS | HEART RATE: 79 BPM | TEMPERATURE: 98 F

## 2024-05-01 DIAGNOSIS — N95.8 SIMPLE ADNEXAL CYST GREATER THAN 1 CM IN DIAMETER IN POSTMENOPAUSAL PATIENT: Primary | ICD-10-CM

## 2024-05-01 DIAGNOSIS — R10.9 RIGHT SIDED ABDOMINAL PAIN: Primary | ICD-10-CM

## 2024-05-01 DIAGNOSIS — R10.9 RIGHT FLANK PAIN: ICD-10-CM

## 2024-05-01 DIAGNOSIS — N94.89 SIMPLE ADNEXAL CYST GREATER THAN 1 CM IN DIAMETER IN POSTMENOPAUSAL PATIENT: Primary | ICD-10-CM

## 2024-05-01 LAB
ALBUMIN SERPL BCP-MCNC: 4.7 G/DL (ref 3.5–5.2)
ALP SERPL-CCNC: 58 U/L (ref 55–135)
ALT SERPL W/O P-5'-P-CCNC: 11 U/L (ref 10–44)
ANION GAP SERPL CALC-SCNC: 8 MMOL/L (ref 8–16)
AST SERPL-CCNC: 30 U/L (ref 10–40)
BASOPHILS # BLD AUTO: 0.08 K/UL (ref 0–0.2)
BASOPHILS NFR BLD: 1 % (ref 0–1.9)
BILIRUB SERPL-MCNC: 0.4 MG/DL (ref 0.1–1)
BILIRUB UR QL STRIP: NEGATIVE
BUN SERPL-MCNC: 12 MG/DL (ref 6–20)
CALCIUM SERPL-MCNC: 9.3 MG/DL (ref 8.7–10.5)
CHLORIDE SERPL-SCNC: 103 MMOL/L (ref 95–110)
CLARITY UR: CLEAR
CO2 SERPL-SCNC: 24 MMOL/L (ref 23–29)
COLOR UR: COLORLESS
CREAT SERPL-MCNC: 0.7 MG/DL (ref 0.5–1.4)
DIFFERENTIAL METHOD BLD: ABNORMAL
EOSINOPHIL # BLD AUTO: 0.2 K/UL (ref 0–0.5)
EOSINOPHIL NFR BLD: 2.6 % (ref 0–8)
ERYTHROCYTE [DISTWIDTH] IN BLOOD BY AUTOMATED COUNT: 12.2 % (ref 11.5–14.5)
EST. GFR  (NO RACE VARIABLE): >60 ML/MIN/1.73 M^2
GLUCOSE SERPL-MCNC: 132 MG/DL (ref 70–110)
GLUCOSE UR QL STRIP: NEGATIVE
HCT VFR BLD AUTO: 38.4 % (ref 37–48.5)
HGB BLD-MCNC: 13.4 G/DL (ref 12–16)
HGB UR QL STRIP: NEGATIVE
IMM GRANULOCYTES # BLD AUTO: 0.08 K/UL (ref 0–0.04)
IMM GRANULOCYTES NFR BLD AUTO: 1 % (ref 0–0.5)
KETONES UR QL STRIP: NEGATIVE
LEUKOCYTE ESTERASE UR QL STRIP: NEGATIVE
LIPASE SERPL-CCNC: 28 U/L (ref 4–60)
LYMPHOCYTES # BLD AUTO: 2.1 K/UL (ref 1–4.8)
LYMPHOCYTES NFR BLD: 26.5 % (ref 18–48)
MCH RBC QN AUTO: 31.5 PG (ref 27–31)
MCHC RBC AUTO-ENTMCNC: 34.9 G/DL (ref 32–36)
MCV RBC AUTO: 90 FL (ref 82–98)
MONOCYTES # BLD AUTO: 0.5 K/UL (ref 0.3–1)
MONOCYTES NFR BLD: 6.4 % (ref 4–15)
NEUTROPHILS # BLD AUTO: 5 K/UL (ref 1.8–7.7)
NEUTROPHILS NFR BLD: 62.5 % (ref 38–73)
NITRITE UR QL STRIP: NEGATIVE
NRBC BLD-RTO: 0 /100 WBC
PH UR STRIP: 7 [PH] (ref 5–8)
PLATELET # BLD AUTO: 243 K/UL (ref 150–450)
PMV BLD AUTO: 10.8 FL (ref 9.2–12.9)
POTASSIUM SERPL-SCNC: 4.9 MMOL/L (ref 3.5–5.1)
PROT SERPL-MCNC: 7.2 G/DL (ref 6–8.4)
PROT UR QL STRIP: NEGATIVE
RBC # BLD AUTO: 4.26 M/UL (ref 4–5.4)
SODIUM SERPL-SCNC: 135 MMOL/L (ref 136–145)
SP GR UR STRIP: 1.01 (ref 1–1.03)
URN SPEC COLLECT METH UR: ABNORMAL
UROBILINOGEN UR STRIP-ACNC: NEGATIVE EU/DL
WBC # BLD AUTO: 8.03 K/UL (ref 3.9–12.7)

## 2024-05-01 PROCEDURE — 99285 EMERGENCY DEPT VISIT HI MDM: CPT | Mod: 25

## 2024-05-01 PROCEDURE — 25500020 PHARM REV CODE 255: Performed by: NURSE PRACTITIONER

## 2024-05-01 PROCEDURE — 96360 HYDRATION IV INFUSION INIT: CPT | Mod: 59

## 2024-05-01 PROCEDURE — 81003 URINALYSIS AUTO W/O SCOPE: CPT | Performed by: NURSE PRACTITIONER

## 2024-05-01 PROCEDURE — 85025 COMPLETE CBC W/AUTO DIFF WBC: CPT | Performed by: NURSE PRACTITIONER

## 2024-05-01 PROCEDURE — 80053 COMPREHEN METABOLIC PANEL: CPT | Performed by: NURSE PRACTITIONER

## 2024-05-01 PROCEDURE — 99422 OL DIG E/M SVC 11-20 MIN: CPT | Mod: ,,, | Performed by: FAMILY MEDICINE

## 2024-05-01 PROCEDURE — 25000003 PHARM REV CODE 250: Performed by: NURSE PRACTITIONER

## 2024-05-01 PROCEDURE — 83690 ASSAY OF LIPASE: CPT | Performed by: NURSE PRACTITIONER

## 2024-05-01 RX ADMIN — SODIUM CHLORIDE 1000 ML: 9 INJECTION, SOLUTION INTRAVENOUS at 08:05

## 2024-05-01 RX ADMIN — IOHEXOL 100 ML: 350 INJECTION, SOLUTION INTRAVENOUS at 10:05

## 2024-05-01 NOTE — PROGRESS NOTES
Patient ID: Daniella Turner is a 39 y.o. female.    Chief Complaint: Abdominal Pain    The patient initiated a request through zanda on 5/1/2024 for evaluation and management with a chief complaint of Abdominal Pain     I evaluated the questionnaire submission on 5/1/24.    [facility]    5/1/2024 11:17 AM CDT - Filed by Patient   Do you agree to participate in an E-Visit? Yes   If you have any of the following symptoms, please present to your local ER or call 911:  I acknowledge   What is the main issue you would like addressed today? Pain in right side worsened by drinking and sometimes eating   Are you able to take your vital signs? No   Are you pregnant, could you be pregnant, or are you breast feeding? None of the above   Please describe your symptoms Pain in right side   Where is your problem located? Right side top of hip to under ribs   How severe are your symptoms? Mild   Have you had these symptoms before? No   How long have you been having these symptoms? For one to four weeks   Please list any medications or treatments you have used for your condition and indicate if it was effective or not.    What makes this feel better? Not sure it comes and goes   What makes this feel worse? Drinking   Are these symptoms related to a condition that you currently have? No   Please describe any probable cause for these symptoms Not sure   Provide any additional information you feel is important.    Please attach any relevant images or files          Encounter Diagnosis   Name Primary?    Right sided abdominal pain Yes        Orders Placed This Encounter   Procedures    US Abdomen Limited     Standing Status:   Future     Standing Expiration Date:   8/1/2024     Order Specific Question:   May the Radiologist modify the order per protocol to meet the clinical needs of the patient?     Answer:   Yes    CBC Auto Differential     Standing Status:   Future     Standing Expiration Date:   11/1/2025     Comprehensive Metabolic Panel     Standing Status:   Future     Standing Expiration Date:   11/1/2025            Follow up if symptoms worsen or fail to improve.      E-Visit Time Tracking:    Day 1 Time (in minutes): 7  Day 2 Time (in minutes): 4    Total Time (in minutes): 11

## 2024-05-02 ENCOUNTER — TELEPHONE (OUTPATIENT)
Dept: PRIMARY CARE CLINIC | Facility: CLINIC | Age: 39
End: 2024-05-02
Payer: COMMERCIAL

## 2024-05-02 ENCOUNTER — PATIENT MESSAGE (OUTPATIENT)
Dept: OBSTETRICS AND GYNECOLOGY | Facility: CLINIC | Age: 39
End: 2024-05-02
Payer: COMMERCIAL

## 2024-05-02 NOTE — TELEPHONE ENCOUNTER
Called pt regarding message. Pt stated she went to the ER on 5/1. Pt stated she had ultrasound & labs.

## 2024-05-02 NOTE — ED PROVIDER NOTES
Source of History:  Patient, chart, family    Chief complaint:  Flank Pain (Right sided flank pain that started about a week ago but has been intermittent - no urinary symptoms - pain got worse she patient ate - stabbing/burning pain )      HPI:  Daniella Turner is a 39 y.o. female with medical history of anxiety, hypertension, PCOS presenting with right-sided flank pain.  Patient states pain has been intermittent for the past week.  Patient states pain is worse with eating.  Patient denies any bowel or bladder incontinence.    This is the extent to the patients complaints today here in the emergency department.    ROS: As per HPI and below:  Constitutional: No fever.  No chills.  Eyes: No visual changes.  ENT: No sore throat. No ear pain    Cardiovascular: No chest pain.  Respiratory: No pain.  No shortness of breath.  GI:  Positive for flank pain.  Positive for abdominal pain.  Genitourinary: No abnormal urination.  Neurologic: No headache. No focal weakness.  No numbness.  MSK: no back pain.  Integument: No rashes or lesions.  Hematologic: No easy bruising.  Endocrine: No excessive thirst or urination.    Review of patient's allergies indicates:   Allergen Reactions    Ace inhibitors      cough       PMH:  As per HPI and below:  Past Medical History:   Diagnosis Date    Anxiety     Hypertension     PCOS (polycystic ovarian syndrome)      Past Surgical History:   Procedure Laterality Date    HYSTERECTOMY      NASAL SEPTUM SURGERY      TONSILLECTOMY         Social History     Tobacco Use    Smoking status: Every Day     Types: Vaping with nicotine    Smokeless tobacco: Never   Substance Use Topics    Alcohol use: Yes     Comment: occasionaly    Drug use: Never       Physical Exam:    BP (!) 159/87 (BP Location: Right arm, Patient Position: Lying)   Pulse 79   Temp 97.7 °F (36.5 °C) (Oral)   Resp 16   Wt 60.8 kg (134 lb)   SpO2 99%   BMI 27.06 kg/m²   Nursing note and vital signs reviewed.  Constitutional: No  acute distress.  Nontoxic  Eyes: No conjunctival injection.  Extraocular muscles are intact.  ENT:  Mucous membranes pink and moist Oropharynx clear.  Normal phonation.  Cardiovascular: Regular rate and rhythm.  No murmurs. No gallops. No rubs  Respiratory: Clear to auscultation bilaterally.  Good air movement.  No wheezes.  No rhonchi. No rales. No accessory muscle use.  Abdomen:  TTP to right upper quadrant and right flank.  NO rebound or guarding noted on exam.  BS WNL.  Non peritoneal on exam.  Musculoskeletal: Good range of motion all joints.  No deformities.  Neck supple.  No meningismus.  Skin: No rashes seen.  Good turgor.  No abrasions.  No ecchymoses.  Neuro: alert and oriented x3,  no focal neurological deficits.  Psych: Appropriate, conversant    MDM    Emergent evaluation of a 40 yo female presenting for right flank pain and .  On exam pt is A&Ox3. VSS. Nonfebrile and nontoxic appearing.  Mucous membranes pink and moist. Breath sounds clear bilaterally.  TTP to right upper quadrant and right flank.  NO rebound or guarding noted on exam.  BS WNL.  Non peritoneal on exam. Pt speaking in full sentences.  Steady gait appreciated. Cap refill < 3 seconds.      History Acquisition   Additional history was acquired from other historians.  Chart, family  The patient's list of active medical problems, social history, medications, and allergies as documented per RN staff has been reviewed.     Differential Diagnoses   Based on available information and the initial assessment, the working differential diagnoses considered during this evaluation include but are not limited to gastritis, gastroenteritis, GERD, endometriosis, kidney stone, pyelonephritis, cholecystitis, pancreatitis, others.    I will get labs, imaging, hydrate and reassess.  I discussed this case with my supervising physician.      LABS     Labs Reviewed   CBC W/ AUTO DIFFERENTIAL - Abnormal; Notable for the following components:       Result Value     MCH 31.5 (*)     Immature Granulocytes 1.0 (*)     Immature Grans (Abs) 0.08 (*)     All other components within normal limits   COMPREHENSIVE METABOLIC PANEL - Abnormal; Notable for the following components:    Sodium 135 (*)     Glucose 132 (*)     All other components within normal limits   URINALYSIS, REFLEX TO URINE CULTURE - Abnormal; Notable for the following components:    Color, UA Colorless (*)     All other components within normal limits    Narrative:     Specimen Source->Urine   LIPASE         Imaging     Imaging Results              CT Abdomen Pelvis With IV Contrast NO Oral Contrast (Final result)  Result time 05/01/24 22:54:46      Final result by Geoff Olmedo MD (05/01/24 22:54:46)                   Impression:      Slight urinary bladder wall thickening.  Correlation with urinalysis to exclude cystitis/infection advised.    Otherwise, no CT evidence of acute intra-abdominal abnormality.    Mildly complex 2.5 cm right adnexal cyst.  Hysterectomy.  No significant free fluid in the pelvis.      Electronically signed by: Geoff Olmedo MD  Date:    05/01/2024  Time:    22:54               Narrative:    EXAMINATION:  CT ABDOMEN PELVIS WITH IV CONTRAST    CLINICAL HISTORY:  Abdominal pain, acute, nonlocalized;    TECHNIQUE:  Low dose axial images, sagittal and coronal reformations were obtained from the lung bases to the pubic symphysis following the IV administration of 100 mL of Omnipaque 350 .  Oral contrast was not given.    COMPARISON:  Ultrasound 05/01/2024    FINDINGS:  The lung bases are unremarkable.  There is no pleural fluid present.  The visualized portions of the heart appear normal.    The liver is normal in size and attenuation with no focal hepatic abnormality.  The gallbladder is contracted limiting assessment.  No definite radiopaque calculi identified in the gallbladder lumen.  There is no intra-or extrahepatic biliary ductal dilatation.    The stomach, spleen, pancreas, and  adrenal glands are unremarkable.    The kidneys are normal in size and location and enhance symmetrically.  There is no evidence of hydronephrosis. Urinary bladder demonstrates slight circumferential wall thickening.  Uterus appears to be surgically absent.  There is a mildly complex 2.5 cm right adnexal cyst.  No significant free fluid within the pelvis.    The abdominal aorta is normal in course and caliber with minimal atherosclerotic calcification along its course.  There is no retroperitoneal hematoma.    The visualized loops of small and large bowel show no evidence of obstruction or inflammation. There is no CT evidence of acute appendicitis. There is no ascites, free fluid, or intraperitoneal free air. There are scattered shotty small mesenteric and retroperitoneal lymph nodes. There is a small fat containing umbilical hernia.    The visualized osseous structures appear intact.  The extraperitoneal soft tissues are unremarkable.                                       US Abdomen Limited (Final result)  Result time 05/01/24 20:27:24      Final result by Cheryl Olmedo MD (05/01/24 20:27:24)                   Impression:      Contracted gallbladder, presumably relating to patient's reported postprandial status.  Allowing for this, no significant sonographic abnormality identified.  Further assessment as warranted clinically.      Electronically signed by: Cheryl Olmedo MD  Date:    05/01/2024  Time:    20:27               Narrative:    EXAMINATION:  US ABDOMEN LIMITED    CLINICAL HISTORY:  Abdominal Pain - Gallbladder;    TECHNIQUE:  Limited ultrasound of the right upper quadrant of the abdomen (including pancreas, liver, gallbladder, common bile duct, and right kidney) was performed.    COMPARISON:  None.    FINDINGS:  Pancreas: Unremarkable in its visualized extent.    Aorta and IVC: Unremarkable in its visualized extent    Liver: Normal in size, measuring 14.0 cm. Homogeneous echotexture. No focal hepatic  lesions.    Gallbladder: The gallbladder is contracted, presumably relating to patient's reported postprandial status.  Please note this does limit assessment.  No definite sonographic evidence of discrete cholelithiasis.  Allowing for this, no significant gallbladder wall thickening or gallbladder wall hyperemia identified.  Sonographic Story sign is reported to be negative by the ultrasound technologist.    Biliary system: The common duct is not dilated, measuring 3 mm.  No intrahepatic ductal dilatation.    Right kidney: Normal in size with no hydronephrosis, measuring 8.7 cm.    Miscellaneous: No upper abdominal ascites.                                      A radiology report was available for my review at the time of the encounter.    Additional Consideration   All available testing was considered during the course of this workup.  Comorbidities taken into consideration during the patient's evaluation and treatment include weight, age.    Social determinants of health were taken into consideration during development of our treatment plan.    Medications   sodium chloride 0.9% bolus 1,000 mL 1,000 mL (1,000 mLs Intravenous New Bag 5/1/24 2040)   iohexoL (OMNIPAQUE 350) injection 100 mL (100 mLs Intravenous Given 5/1/24 2234)      ED Course as of 05/01/24 2308   Wed May 01, 2024   2205 CBC unremarkable.  No leukocytosis noted.  H&H stable at 13.4 and 38.4.  CMP unremarkable.  Lipase negative.  UA negative for any acute infectious process.  Ultrasound independently reviewed by myself and Radiology.  Negative for any acute abnormalities.  Awaiting for CT [RZ]   2307 CT independently reviewed by myself and Radiology.  Mildly complex right-sided adnexal cyst noted.  Patient reassessed.  Patient updated on imaging results.  Advised to continue with medications as prescribed.  Follow up with surgeon as scheduled.  Strict return to ED precautions discussed.  Patient verbalized understanding of this plan of care.  All  questions and concerns addressed. [RZ]   2308 Patient is hemodynamically stable, vital signs are normal. Discharge instructions given. Return to ED precautions discussed. Follow up as directed. Pt verbalized understanding of this plan.  Pt is stable for discharge.  [RZ]      ED Course User Index  [RZ] Rachel Orantes NP             CLINICAL IMPRESSION  1. Simple adnexal cyst greater than 1 cm in diameter in postmenopausal patient    2. Right flank pain         ED Disposition Condition    Discharge Stable            Instruction:  I see no indication of an emergent process beyond that addressed during our encounter but have duly counseled the patient/family regarding the need for prompt follow-up as well as the indications that should prompt immediate return to the emergency room should new or worrisome developments occur.  The patient/family has been provided with verbal and printed direction regarding our final diagnosis(es) as well as instructions regarding use of OTC and/or Rx medications intended to manage the patient's aforementioned conditions including:  ED Prescriptions    None       Patient has been advised of following recommended follow-up instructions:  Follow-up Information       Follow up With Specialties Details Why Contact Info    Ronny Mims MD Family Medicine Schedule an appointment as soon as possible for a visit  As needed [address]  [address]  [facility] [id number] [phone]            The patient/family communicates understanding of all this information and all remaining questions and concerns were addressed at this time.      The patient's condition did not warrant review of the  and prescription of controlled substances.      This note was created using dictation software.  This program may occasionally mistype words and phrases.         Rachel Orantes NP  05/01/24 4090

## 2024-05-02 NOTE — DISCHARGE INSTRUCTIONS
You were seen and evaluated in the ER today.  Your imaging here shows you have a right-sided adnexal cyst.  All other imaging and labs are unremarkable.  Please continue to rotate Tylenol ibuprofen as needed for pain.  Please follow-up with your PCP as needed.  Please follow-up with your surgeon as scheduled.  Please return to the ED for any worsening symptoms such as chest pain, shortness of breath, fever not controlled with Tylenol or ibuprofen or uncontrolled pain.      Our goal in the emergency department is to always give you outstanding care and exceptional service. You may receive a survey by mail or e-mail in the next week regarding your experience in our ED. We would greatly appreciate your completing and returning the survey. Your feedback provides us with a way to recognize our staff who give very good care and it helps us learn how to improve when your experience was below our aspiration of excellence.

## 2024-06-14 ENCOUNTER — OFFICE VISIT (OUTPATIENT)
Dept: OBSTETRICS AND GYNECOLOGY | Facility: CLINIC | Age: 39
End: 2024-06-14
Payer: COMMERCIAL

## 2024-06-14 VITALS
BODY MASS INDEX: 27.25 KG/M2 | DIASTOLIC BLOOD PRESSURE: 82 MMHG | WEIGHT: 134.94 LBS | SYSTOLIC BLOOD PRESSURE: 123 MMHG

## 2024-06-14 DIAGNOSIS — N80.9 ENDOMETRIOSIS DETERMINED BY LAPAROSCOPY: Primary | ICD-10-CM

## 2024-06-14 DIAGNOSIS — N89.8 VAGINAL LESION: ICD-10-CM

## 2024-06-14 PROCEDURE — 99499 UNLISTED E&M SERVICE: CPT | Mod: S$GLB,,, | Performed by: OBSTETRICS & GYNECOLOGY

## 2024-06-14 PROCEDURE — 99999 PR PBB SHADOW E&M-EST. PATIENT-LVL III: CPT | Mod: PBBFAC,,, | Performed by: OBSTETRICS & GYNECOLOGY

## 2024-06-14 RX ORDER — AMLODIPINE BESYLATE 2.5 MG/1
5 TABLET ORAL
COMMUNITY
Start: 2024-04-18

## 2024-06-14 NOTE — PROGRESS NOTES
Patient ID: Daniella Turner is a 39 y.o. female.    Chief Complaint:  Pre-op Exam      Patient Active Problem List   Diagnosis    PCOS (polycystic ovarian syndrome)    Essential hypertension, benign    Anxiety    Seasonal allergies    Vaping nicotine dependence, non-tobacco product    Primary insomnia    Subclinical hypothyroidism       History of Present Illness    Here for preop visit.     Plan for laparoscopy and excision of vaginal lesion. Consider conversion to robotic if endo is extensive. Pt agrees.   See full H&P    Past Medical History:   Diagnosis Date    Anxiety     Hypertension     PCOS (polycystic ovarian syndrome)        Past Surgical History:   Procedure Laterality Date    HYSTERECTOMY      NASAL SEPTUM SURGERY      TONSILLECTOMY         OB History    Para Term  AB Living   1 1 1     1   SAB IAB Ectopic Multiple Live Births           1      # Outcome Date GA Lbr Pollo/2nd Weight Sex Type Anes PTL Lv   1 Term 02    F Vag-Spont None N REGINALD       No LMP recorded. Patient has had a hysterectomy.   Date of Last Pap: No result found    Review of Systems  Review of Systems     Objective:   Physical Exam     Assessment/ Plan:     1. Endometriosis determined by laparoscopy        2. Vaginal lesion            To OR for Laparoscopy, EUA, excision of vaginal lesion    All risks and benefits explained, including but not limited to damage to internal organs, including but not limited to uterus, tubes, ovaries, vagina, vulva, urethra, possible inability to remove all tissue,  possible blood transfusion, possible need to convert to open procedure. Patient is aware of all risks and desires surgery. All questions were answered. Consents were signed.

## 2024-06-19 ENCOUNTER — ANESTHESIA EVENT (OUTPATIENT)
Dept: SURGERY | Facility: HOSPITAL | Age: 39
End: 2024-06-19
Payer: COMMERCIAL

## 2024-06-19 RX ORDER — FAMOTIDINE 20 MG/1
20 TABLET, FILM COATED ORAL
Status: DISCONTINUED | OUTPATIENT
Start: 2024-06-19 | End: 2024-06-20

## 2024-06-19 NOTE — H&P
[facility] ([facility])  History & Physical  Gynecology    SUBJECTIVE:     Chief Complaint/Reason for Admission: endometriosis    History of Present Illness:  Daniella Turner is a 39 y.o. female  who presents for surgical consult. Referred by Iveth Marcos NP. Her PGYNHx is signficant for have a TLH 5 years ago for heavy painful periods with a known dx of endometriosis. She was attempting to have another baby and during that workup discovered she had endometriosis and that her partner had Azospermia. Decided to proceed with TLH and not remove ovaries. She did fine for a couple of years and then recently had been getting cyclic pain that starts in her back and then radiates to her abdomen. Then when she gets the pain she will have small amount of vaginal bleeding that is dark. And then after a day or so the pain goes away. Sometimes has bleeding with sex. Discussed in detail. Of note she is having a cardiac workup soon for palpitations. Her previous GYN said when she did the hysterectomy that she did have endo that she did not remove because it was close to her urinary tract. Discussed in detail with patient.     On exam there is a nodule in the upper left vaginal apex suspicious for endometriosis.     At the time we tried OCP to see if that would help with the pain. The pain was better on OCP but the patient stopped because she did not like how she felt on it. She would like to proceed with surgery for removal of vaginal lesion and laparoscopic evaluation of endometriosis.     The plan is to start laparoscopically and if the endo is severe then robotic assisted. Pt agrees. Consents signed.     No medications prior to admission.       Review of patient's allergies indicates:   Allergen Reactions    Ace inhibitors      cough       Past Medical History:   Diagnosis Date    Anxiety     Hypertension     PCOS (polycystic ovarian syndrome)      Past Surgical History:   Procedure Laterality Date     HYSTERECTOMY      NASAL SEPTUM SURGERY      TONSILLECTOMY       Family History   Problem Relation Name Age of Onset    Hypertension Mother      Graves' disease Mother      Thyroid disease Mother      Hypertension Father      Hypertension Sister      No Known Problems Daughter      Heart disease Maternal Grandmother      Hypertension Maternal Grandmother      Hypertension Maternal Grandfather lung cancer     Cancer Maternal Grandfather lung cancer     Hypertension Paternal Grandmother      Heart disease Paternal Grandmother      Hypertension Paternal Grandfather colon cancer     Cancer Paternal Grandfather colon cancer      Social History     Tobacco Use    Smoking status: Every Day     Types: Vaping with nicotine    Smokeless tobacco: Never   Substance Use Topics    Alcohol use: Yes     Comment: occasionaly    Drug use: Never       Review of Systems:  Constitutional: no fever or chills  Respiratory: no cough or shortness of breath  Cardiovascular: no chest pain or palpitations  Genitourinary: no hematuria or dysuria     OBJECTIVE:     Vital Signs (Most Recent):       Physical Exam:  General: well developed, well nourished      Laboratory:  Lab Results   Component Value Date    WBC 8.03 05/01/2024    HGB 13.4 05/01/2024    HCT 38.4 05/01/2024    MCV 90 05/01/2024     05/01/2024       Ultrasound:  Results for orders placed during the hospital encounter of 02/22/24    US Pelvis Comp with Transvag NON-OB (xpd    Narrative  EXAMINATION:  US PELVIS COMP WITH TRANSVAG NON-OB (XPD)    CLINICAL HISTORY:  Pelvic and perineal pain    TECHNIQUE:  Transabdominal sonography of the pelvis was performed, followed by transvaginal sonography to better evaluate the uterus and ovaries.    COMPARISON:  None.    FINDINGS:  Uterus:    Absent    Right ovary:    Size: 3.5 x 2.7 x 2.9 cm    Appearance: Mostly simple appearing anechoic cyst with area of peripheral septation overall measuring 2.0 x 1.6 x 1.7 cm.    Vascular flow:  Present    Left ovary:    Size: 3.3 x 2.4 x 2.5 cm    Appearance: Best seen transabdominal with limited visualization on transvaginal portion.  Nonspecific mildly echogenic area measuring 0.5 x 0.7 cm.    Vascular Flow: Present    Free Fluid:    None significant.    Impression  Prior hysterectomy.    Nonspecific mildly echogenic focus versus area of heterogeneous stroma at the left ovary, best seen on limited transabdominal view.  Subsequent follow-up as warranted.      Electronically signed by: Nelson Rea  Date:    02/23/2024  Time:    08:23          ASSESSMENT/PLAN:     There are no hospital problems to display for this patient.      To OR for EUA, excision of vaginal lesion, diagnostic laparoscopy. Excision or ablation of endometriosis possibly with the robot.   Risks and benefits explained in detail to patient, including but not limited to damage to internal organs, including but not limited to bowel, bladder, uterus, tubes, ovaries, nerves, arteries, veins, possible need for blood transfusion. Patient is aware of all risks and desires surgery. All questions answered. Consents signed.

## 2024-06-19 NOTE — PRE-PROCEDURE INSTRUCTIONS
The following was discussed with pt via phone and sent to pt portal. Pt verbalized understanding. Pt to be accompanied by her .    Dear Daniella ,    You are scheduled for a procedure with Dr. Rodrigues on 6/20/2024. Your scheduled arrival time is 9:05am.  This arrival time is roughly 2 hours before your anticipated procedure time to allow sufficient time for pre-op.  Please wear comfortable clothes.  This procedure will take place at the [facility] at the corner of [address].  [facility] [place] [facility].  The address is:    [address]  [place] [address]    After entering the building, you will proceed to the second floor where you can check in with registration. You should take any medications that you routinely take for blood pressure (other than those listed below), heart medications, thyroid, cholesterol, etc.     If you wear contact lenses, please wear glasses to your procedure.    Your fasting instructions are as follow:  Nothing to eat after midnight the evening before your surgery. You may drink clear liquids up until 2 hours prior to your arrival time. You MUST have a responsible adult to bring you home.      The evening before and morning of your procedure, please hold the following medications:  -Aspirin and Aspirin-containing products (Goody's powder, Excedrin)  -NSAIDs (Advil, Ibuprofen, Aleve, Diclofenac)  -Vitamins/Supplements  -Herbal remedies/Teas  -Stimulants (Adderall, Vyvanse, Adipex)  -Diabetic medication (Please bring with you day of procedure)  -Prescription creams/gels/lotions    -May take Tylenol      The evening before and morning of your procedure, take a shower using antibacterial soap (ex: Hibiclens or Dial antibacterial soap). DO NOT apply deodorant, lotion, cologne, or anything else to the skin. Wear loose, comfortable fitting clothing. Do not wear jewelry or bring any valuables with you. If you  wear dentures or contacts, please bring your case with you or leave them at home. Use and bring any inhalers that you may have.    If you have any procedure-specific questions, please call your surgeon's office. Any other questions, don't hesitate to call at [phone].    Thanks,  DANTE Berry  Pre-Admit Testing  Anesthesia Dept [facility]

## 2024-06-20 ENCOUNTER — HOSPITAL ENCOUNTER (OUTPATIENT)
Facility: HOSPITAL | Age: 39
Discharge: HOME | End: 2024-06-20
Attending: OBSTETRICS & GYNECOLOGY | Admitting: OBSTETRICS & GYNECOLOGY
Payer: COMMERCIAL

## 2024-06-20 ENCOUNTER — TELEPHONE (OUTPATIENT)
Dept: OBSTETRICS AND GYNECOLOGY | Facility: CLINIC | Age: 39
End: 2024-06-20
Payer: COMMERCIAL

## 2024-06-20 ENCOUNTER — ANESTHESIA (OUTPATIENT)
Dept: SURGERY | Facility: HOSPITAL | Age: 39
End: 2024-06-20
Payer: COMMERCIAL

## 2024-06-20 VITALS
WEIGHT: 135 LBS | RESPIRATION RATE: 14 BRPM | SYSTOLIC BLOOD PRESSURE: 122 MMHG | BODY MASS INDEX: 27.21 KG/M2 | TEMPERATURE: 98 F | DIASTOLIC BLOOD PRESSURE: 76 MMHG | HEIGHT: 59 IN | HEART RATE: 57 BPM | OXYGEN SATURATION: 99 %

## 2024-06-20 DIAGNOSIS — N80.9 ENDOMETRIOSIS: Primary | ICD-10-CM

## 2024-06-20 LAB
B-HCG UR QL: NEGATIVE
CTP QC/QA: YES

## 2024-06-20 PROCEDURE — 94761 N-INVAS EAR/PLS OXIMETRY MLT: CPT

## 2024-06-20 PROCEDURE — 36000708 HC OR TIME LEV III 1ST 15 MIN: Performed by: OBSTETRICS & GYNECOLOGY

## 2024-06-20 PROCEDURE — 49321 LAPAROSCOPY BIOPSY: CPT | Mod: AS,,, | Performed by: NURSE PRACTITIONER

## 2024-06-20 PROCEDURE — 63600175 PHARM REV CODE 636 W HCPCS: Performed by: NURSE ANESTHETIST, CERTIFIED REGISTERED

## 2024-06-20 PROCEDURE — 81025 URINE PREGNANCY TEST: CPT | Performed by: OBSTETRICS & GYNECOLOGY

## 2024-06-20 PROCEDURE — 25000003 PHARM REV CODE 250: Performed by: OBSTETRICS & GYNECOLOGY

## 2024-06-20 PROCEDURE — 88305 TISSUE EXAM BY PATHOLOGIST: CPT | Mod: 26,,, | Performed by: PATHOLOGY

## 2024-06-20 PROCEDURE — 71000016 HC POSTOP RECOV ADDL HR: Performed by: OBSTETRICS & GYNECOLOGY

## 2024-06-20 PROCEDURE — 36000709 HC OR TIME LEV III EA ADD 15 MIN: Performed by: OBSTETRICS & GYNECOLOGY

## 2024-06-20 PROCEDURE — 49321 LAPAROSCOPY BIOPSY: CPT | Mod: ,,, | Performed by: OBSTETRICS & GYNECOLOGY

## 2024-06-20 PROCEDURE — 37000009 HC ANESTHESIA EA ADD 15 MINS: Performed by: OBSTETRICS & GYNECOLOGY

## 2024-06-20 PROCEDURE — 25000003 PHARM REV CODE 250: Performed by: NURSE ANESTHETIST, CERTIFIED REGISTERED

## 2024-06-20 PROCEDURE — 27201423 OPTIME MED/SURG SUP & DEVICES STERILE SUPPLY: Performed by: OBSTETRICS & GYNECOLOGY

## 2024-06-20 PROCEDURE — 71000015 HC POSTOP RECOV 1ST HR: Performed by: OBSTETRICS & GYNECOLOGY

## 2024-06-20 PROCEDURE — 71000033 HC RECOVERY, INTIAL HOUR: Performed by: OBSTETRICS & GYNECOLOGY

## 2024-06-20 PROCEDURE — 88305 TISSUE EXAM BY PATHOLOGIST: CPT | Performed by: PATHOLOGY

## 2024-06-20 PROCEDURE — 99900035 HC TECH TIME PER 15 MIN (STAT)

## 2024-06-20 PROCEDURE — 37000008 HC ANESTHESIA 1ST 15 MINUTES: Performed by: OBSTETRICS & GYNECOLOGY

## 2024-06-20 RX ORDER — FENTANYL CITRATE 50 UG/ML
INJECTION, SOLUTION INTRAMUSCULAR; INTRAVENOUS
Status: DISCONTINUED | OUTPATIENT
Start: 2024-06-20 | End: 2024-06-20

## 2024-06-20 RX ORDER — LIDOCAINE HYDROCHLORIDE 20 MG/ML
INJECTION INTRAVENOUS
Status: DISCONTINUED | OUTPATIENT
Start: 2024-06-20 | End: 2024-06-20

## 2024-06-20 RX ORDER — DEXMEDETOMIDINE HYDROCHLORIDE 100 UG/ML
INJECTION, SOLUTION INTRAVENOUS
Status: DISCONTINUED | OUTPATIENT
Start: 2024-06-20 | End: 2024-06-20

## 2024-06-20 RX ORDER — DEXTROSE, SODIUM CHLORIDE, SODIUM LACTATE, POTASSIUM CHLORIDE, AND CALCIUM CHLORIDE 5; .6; .31; .03; .02 G/100ML; G/100ML; G/100ML; G/100ML; G/100ML
INJECTION, SOLUTION INTRAVENOUS CONTINUOUS
Status: DISCONTINUED | OUTPATIENT
Start: 2024-06-20 | End: 2024-06-20 | Stop reason: HOSPADM

## 2024-06-20 RX ORDER — DEXTROSE MONOHYDRATE AND SODIUM CHLORIDE 5; .45 G/100ML; G/100ML
INJECTION, SOLUTION INTRAVENOUS CONTINUOUS
Status: DISCONTINUED | OUTPATIENT
Start: 2024-06-20 | End: 2024-06-20 | Stop reason: HOSPADM

## 2024-06-20 RX ORDER — DIPHENHYDRAMINE HCL 25 MG
25 CAPSULE ORAL EVERY 4 HOURS PRN
Status: DISCONTINUED | OUTPATIENT
Start: 2024-06-20 | End: 2024-06-20 | Stop reason: HOSPADM

## 2024-06-20 RX ORDER — OXYCODONE HYDROCHLORIDE 5 MG/1
5 TABLET ORAL EVERY 4 HOURS PRN
Status: DISCONTINUED | OUTPATIENT
Start: 2024-06-20 | End: 2024-06-20 | Stop reason: HOSPADM

## 2024-06-20 RX ORDER — PROPOFOL 10 MG/ML
VIAL (ML) INTRAVENOUS
Status: DISCONTINUED | OUTPATIENT
Start: 2024-06-20 | End: 2024-06-20

## 2024-06-20 RX ORDER — AMOXICILLIN 250 MG
1 CAPSULE ORAL 2 TIMES DAILY
Status: DISCONTINUED | OUTPATIENT
Start: 2024-06-20 | End: 2024-06-20 | Stop reason: HOSPADM

## 2024-06-20 RX ORDER — IBUPROFEN 800 MG/1
800 TABLET ORAL EVERY 8 HOURS PRN
Qty: 15 TABLET | Refills: 0 | Status: SHIPPED | OUTPATIENT
Start: 2024-06-20

## 2024-06-20 RX ORDER — OXYCODONE AND ACETAMINOPHEN 5; 325 MG/1; MG/1
1 TABLET ORAL EVERY 4 HOURS PRN
Qty: 15 TABLET | Refills: 0 | Status: SHIPPED | OUTPATIENT
Start: 2024-06-20

## 2024-06-20 RX ORDER — KETOROLAC TROMETHAMINE 30 MG/ML
INJECTION, SOLUTION INTRAMUSCULAR; INTRAVENOUS
Status: DISCONTINUED | OUTPATIENT
Start: 2024-06-20 | End: 2024-06-20

## 2024-06-20 RX ORDER — KETAMINE HCL IN 0.9 % NACL 50 MG/5 ML
SYRINGE (ML) INTRAVENOUS
Status: DISCONTINUED | OUTPATIENT
Start: 2024-06-20 | End: 2024-06-20

## 2024-06-20 RX ORDER — OXYCODONE HYDROCHLORIDE 5 MG/1
10 TABLET ORAL EVERY 4 HOURS PRN
Status: DISCONTINUED | OUTPATIENT
Start: 2024-06-20 | End: 2024-06-20 | Stop reason: HOSPADM

## 2024-06-20 RX ORDER — SODIUM CHLORIDE 9 MG/ML
INJECTION, SOLUTION INTRAVENOUS CONTINUOUS
Status: DISCONTINUED | OUTPATIENT
Start: 2024-06-20 | End: 2024-06-20 | Stop reason: HOSPADM

## 2024-06-20 RX ORDER — IBUPROFEN 200 MG
600 TABLET ORAL EVERY 6 HOURS PRN
Status: DISCONTINUED | OUTPATIENT
Start: 2024-06-20 | End: 2024-06-20 | Stop reason: HOSPADM

## 2024-06-20 RX ORDER — DEXAMETHASONE SODIUM PHOSPHATE 4 MG/ML
INJECTION, SOLUTION INTRA-ARTICULAR; INTRALESIONAL; INTRAMUSCULAR; INTRAVENOUS; SOFT TISSUE
Status: DISCONTINUED | OUTPATIENT
Start: 2024-06-20 | End: 2024-06-20

## 2024-06-20 RX ORDER — ROCURONIUM BROMIDE 10 MG/ML
INJECTION, SOLUTION INTRAVENOUS
Status: DISCONTINUED | OUTPATIENT
Start: 2024-06-20 | End: 2024-06-20

## 2024-06-20 RX ORDER — ONDANSETRON 8 MG/1
8 TABLET, ORALLY DISINTEGRATING ORAL EVERY 8 HOURS PRN
Status: DISCONTINUED | OUTPATIENT
Start: 2024-06-20 | End: 2024-06-20 | Stop reason: HOSPADM

## 2024-06-20 RX ORDER — MIDAZOLAM HYDROCHLORIDE 1 MG/ML
INJECTION INTRAMUSCULAR; INTRAVENOUS
Status: DISCONTINUED | OUTPATIENT
Start: 2024-06-20 | End: 2024-06-20

## 2024-06-20 RX ORDER — FAMOTIDINE 20 MG/1
20 TABLET, FILM COATED ORAL
Status: COMPLETED | OUTPATIENT
Start: 2024-06-20 | End: 2024-06-20

## 2024-06-20 RX ORDER — ONDANSETRON HYDROCHLORIDE 2 MG/ML
INJECTION, SOLUTION INTRAVENOUS
Status: DISCONTINUED | OUTPATIENT
Start: 2024-06-20 | End: 2024-06-20

## 2024-06-20 RX ORDER — ONDANSETRON 4 MG/1
4 TABLET, ORALLY DISINTEGRATING ORAL EVERY 6 HOURS PRN
Qty: 30 TABLET | Refills: 0 | Status: SHIPPED | OUTPATIENT
Start: 2024-06-20

## 2024-06-20 RX ADMIN — DEXMEDETOMIDINE 8 MCG: 200 INJECTION, SOLUTION INTRAVENOUS at 11:06

## 2024-06-20 RX ADMIN — ONDANSETRON 8 MG: 8 TABLET, ORALLY DISINTEGRATING ORAL at 12:06

## 2024-06-20 RX ADMIN — OXYCODONE 10 MG: 5 TABLET ORAL at 12:06

## 2024-06-20 RX ADMIN — ROCURONIUM BROMIDE 40 MG: 10 INJECTION, SOLUTION INTRAVENOUS at 10:06

## 2024-06-20 RX ADMIN — Medication 10 MG: at 10:06

## 2024-06-20 RX ADMIN — FAMOTIDINE 20 MG: 20 TABLET ORAL at 09:06

## 2024-06-20 RX ADMIN — GLYCOPYRROLATE 0.2 MG: 0.2 INJECTION INTRAMUSCULAR; INTRAVENOUS at 10:06

## 2024-06-20 RX ADMIN — MIDAZOLAM 2 MG: 1 INJECTION INTRAMUSCULAR; INTRAVENOUS at 10:06

## 2024-06-20 RX ADMIN — FENTANYL CITRATE 50 MCG: 50 INJECTION, SOLUTION INTRAMUSCULAR; INTRAVENOUS at 10:06

## 2024-06-20 RX ADMIN — DEXMEDETOMIDINE 8 MCG: 200 INJECTION, SOLUTION INTRAVENOUS at 10:06

## 2024-06-20 RX ADMIN — ONDANSETRON 4 MG: 2 INJECTION INTRAMUSCULAR; INTRAVENOUS at 10:06

## 2024-06-20 RX ADMIN — DEXAMETHASONE SODIUM PHOSPHATE 8 MG: 4 INJECTION, SOLUTION INTRA-ARTICULAR; INTRALESIONAL; INTRAMUSCULAR; INTRAVENOUS; SOFT TISSUE at 10:06

## 2024-06-20 RX ADMIN — LIDOCAINE HYDROCHLORIDE 80 MG: 20 INJECTION INTRAVENOUS at 10:06

## 2024-06-20 RX ADMIN — SUGAMMADEX 200 MG: 100 INJECTION, SOLUTION INTRAVENOUS at 11:06

## 2024-06-20 RX ADMIN — PROPOFOL 180 MG: 10 INJECTION, EMULSION INTRAVENOUS at 10:06

## 2024-06-20 RX ADMIN — SODIUM CHLORIDE: 9 INJECTION, SOLUTION INTRAVENOUS at 09:06

## 2024-06-20 RX ADMIN — KETOROLAC TROMETHAMINE 30 MG: 30 INJECTION, SOLUTION INTRAMUSCULAR; INTRAVENOUS at 11:06

## 2024-06-20 NOTE — ANESTHESIA POSTPROCEDURE EVALUATION
Anesthesia Post Evaluation    Patient: Daniella Turner    Procedure(s) Performed: Procedure(s) (LRB):  LAPAROSCOPY, DIAGNOSTIC (N/A)  Exam under anesthesia (N/A)  EXCISION, LESION, VAGINA (N/A)    Final Anesthesia Type: general      Patient location during evaluation: PACU  Patient participation: Yes- Able to Participate  Level of consciousness: awake and alert  Post-procedure vital signs: reviewed and stable  Pain management: adequate  Airway patency: patent    PONV status at discharge: No PONV  Anesthetic complications: no      Cardiovascular status: stable  Respiratory status: spontaneous ventilation  Hydration status: euvolemic  Follow-up not needed.          Vitals Value Taken Time   /76 06/20/24 1301   Temp 36.4 °C (97.5 °F) 06/20/24 1151   Pulse 68 06/20/24 1307   Resp 17 06/20/24 1307   SpO2 99 % 06/20/24 1307   Vitals shown include unfiled device data.      Event Time   Out of Recovery 12:12:00         Pain/Ynes Score: Pain Rating Prior to Med Admin: 7 (6/20/2024 12:17 PM)  Ynes Score: 9 (6/20/2024 12:12 PM)

## 2024-06-20 NOTE — DISCHARGE SUMMARY
[facility] ([facility]  Discharge Note  Short Stay    Procedure(s) (LRB):  LAPAROSCOPY, DIAGNOSTIC (N/A)  Exam under anesthesia (N/A)  EXCISION, LESION, VAGINA (N/A)      OUTCOME: Patient tolerated treatment/procedure well without complication and is now ready for discharge.    DISPOSITION: Home or Self Care    FINAL DIAGNOSIS:  Endometriosis determined by laparoscopy    FOLLOWUP: In clinic    DISCHARGE INSTRUCTIONS:    Discharge Procedure Orders   Diet general     No dressing needed     Call MD for:  temperature >100.4     Call MD for:  persistent nausea and vomiting     Call MD for:  severe uncontrolled pain     Call MD for:  difficulty breathing, headache or visual disturbances     Call MD for:  redness, tenderness, or signs of infection (pain, swelling, redness, odor or green/yellow discharge around incision site)     Call MD for:  hives     Call MD for:  persistent dizziness or light-headedness     Call MD for:  extreme fatigue     Call MD for:     Activity as tolerated         Clinical Reference Documents Added to Patient Instructions         Document    DILATION AND CURETTAGE (D AND C) (ENGLISH)    HYSTEROSCOPY DISCHARGE INSTRUCTIONS (ENGLISH)            TIME SPENT ON DISCHARGE: 10 minutes

## 2024-06-20 NOTE — ANESTHESIA PREPROCEDURE EVALUATION
06/20/2024  Daniella Turner is a 39 y.o., female.      Pre-op Assessment    I have reviewed the Patient Summary Reports.     I have reviewed the Nursing Notes. I have reviewed the NPO Status.   I have reviewed the Medications.     Review of Systems  Anesthesia Hx:             Denies Family Hx of Anesthesia complications.    Denies Personal Hx of Anesthesia complications.                    Cardiovascular:     Hypertension                                          Physical Exam    Airway:  Mallampati: II     Anesthesia Plan  Type of Anesthesia, risks & benefits discussed:    Anesthesia Type: Gen ETT  Intra-op Monitoring Plan: Standard ASA Monitors  Post Op Pain Control Plan: multimodal analgesia  Induction:  IV  Airway Plan: Video  Informed Consent: Informed consent signed with the Patient and all parties understand the risks and agree with anesthesia plan.  All questions answered.   ASA Score: 2  Day of Surgery Review of History & Physical: H&P Update referred to the surgeon/provider.    Ready For Surgery From Anesthesia Perspective.   .

## 2024-06-20 NOTE — OP NOTE
[facility] ([facility])  OBGYN  Operative Note    SUMMARY     Date of Procedure: 6/20/2024     Procedure: Procedure(s) (LRB):  LAPAROSCOPY, DIAGNOSTIC (N/A)  Exam under anesthesia (N/A)  EXCISION, LESION, VAGINA (N/A)       Surgeons and Role:     * Sarina Rodrigues MD - Primary    Assisting Surgeon: Iveth Marcos NP    Pre-Operative Diagnosis: Endometriosis determined by laparoscopy [N80.9]  Vaginal lesion [N89.8]    Post-Operative Diagnosis: Post-Op Diagnosis Codes:     * Endometriosis determined by laparoscopy [N80.9]     * Vaginal lesion [N89.8]    Anesthesia: General      Description of the Findings of the Procedure: Laparoscopy showed normal female pelvis. One very small area of endometriosis under the right adnexa in the peritoneal fold that was directly on top of the right ureter. The area was extremely small and was not removed due to possibility of ureteral injury. This lesion is likely not causing any issues. Otherwise normal pelvis. Normal appendix. No adhesions. Vaginal cuff showed no visible signs of infiltrating endometriosis.   Vaginal cuff had a nodule of ?endometriosis vs scar tissue vs granulation tissue vs cervical remnant at the left cuff angle about the size of a marble. Grasped and removed. 2-0 Chromic suture used to close the superficial defect. The abdomen was not entered. Excellent hemostasis.     Complications: No    Estimated Blood Loss (EBL): * No values recorded between 6/20/2024 11:01 AM and 6/20/2024 11:49 AM *    Specimens:   Specimen (24h ago, onward)       Start     Ordered    06/20/24 1124  Specimen to Pathology, Surgery Gynecology and Obstetrics  Once        Comments: Pre-op Diagnosis: Endometriosis determined by laparoscopy [N80.9]Vaginal lesion [N89.8]Procedure(s):LAPAROSCOPY, DIAGNOSTICDESTRUCTION, ENDOMETRIOSISExam under anesthesiaEXCISION, LESION, VAGINAROBOTIC ABLATION OR EXCISION,ENDOMETRIOSIS Number of specimens: 1Name of specimens: 1.) Vaginal Cuff  Lesion / Module- permanent.     References:    Click here for ordering Quick Tip   Question Answer Comment   Procedure Type: Gynecology and Obstetrics    Specimen Class: Routine/Screening    Release to patient Immediate        06/20/24 1126                            Condition: Good    Disposition: PACU - hemodynamically stable.    Attestation: A qualified resident physician was not available    Procedure in detail:    Patient was taken to the operating room where general anesthesia was performed. She was then prepped and draped in the normal sterile fashion. She was placed in the dorsal lithotomy position in Trevon stirrups. Her arms were tucked in the usual fashion. A urbano was placed to gravity. A uterine manipulator was placed in the usual fashion.  Attention was then turned abdominally and a 5 mm skin incision was made with a knife. Towel clamps were used to elevate the abdomen. Direct entery with visualization was used and the abdomen was then insufflated to 15 mmHg. The patient was then placed in trendelenburg position. A 5 mm bladeless trocar was placed in the LLQ in the usual fashion. A 5 mm  bladeless trocar was placed in the RLQ in the usual fashion.   The abdomen was the evaluated and the findings were as above. This was a diagnostic laparoscopy. There was not removal of any tissue.   Attention was turned vaginally. The nodule seen in clinic was noted and elevated. It was excised and a 2-0 Chromic was used to run the superficial vaginal wall with excellent hemostasis. Attention was turned abdominally again and the abdomen was reevaluated. Cuff was intact and there was no defect.   The trocars were then removed. The skin incisions were closed using 4-0 Monocryl. The instruments were removed from the vagina with excellent hemostasis and no active bleeding was noted. The patient tolerated the procedure well. Sponge lap and needle counts correct x 2.

## 2024-06-20 NOTE — PLAN OF CARE
Spoke with Dr Rodrigues and she will call prescription in to pt's pharmacy near her home, informed pt and her .

## 2024-06-20 NOTE — PLAN OF CARE
Pt in preop bay 40, VSS and IV inserted. Pt denies any open wounds on body or the use of any weight loss injections. Pt needs an  updated H&P, procedural consents, an admit order and anesthesia consents, otherwise ready to roll.

## 2024-06-20 NOTE — TRANSFER OF CARE
"Anesthesia Transfer of Care Note    Patient: Daniella Turner    Procedure(s) Performed: Procedure(s) (LRB):  LAPAROSCOPY, DIAGNOSTIC (N/A)  Exam under anesthesia (N/A)  EXCISION, LESION, VAGINA (N/A)    Patient location: PACU    Anesthesia Type: general    Transport from OR: Transported from OR on 6-10 L/min O2 by face mask with adequate spontaneous ventilation    Post pain: adequate analgesia    Post assessment: no apparent anesthetic complications    Post vital signs: stable    Level of consciousness: sedated    Nausea/Vomiting: no nausea/vomiting    Complications: none    Transfer of care protocol was followed      Last vitals: Visit Vitals  BP (!) 135/93 (BP Location: Left arm, Patient Position: Sitting)   Pulse 74   Temp 36.3 °C (97.3 °F) (Skin)   Resp 16   Ht 4' 11" (1.499 m)   Wt 61.2 kg (135 lb)   SpO2 99%   Breastfeeding No   BMI 27.27 kg/m²     "

## 2024-06-20 NOTE — ANESTHESIA PROCEDURE NOTES
Intubation    Date/Time: 6/20/2024 10:45 AM    Performed by: Qamar Hurt CRNA  Authorized by: Isidro Maynard MD    Intubation:     Induction:  Intravenous    Intubated:  Postinduction    Mask Ventilation:  Easy mask    Attempts:  1    Attempted By:  Other (see comments)    Method of Intubation:  Video laryngoscopy    Blade:  Youngblood 3    Laryngeal View Grade: Grade I - full view of cords      Difficult Airway Encountered?: No      Complications:  None    Airway Device:  Oral endotracheal tube    Airway Device Size:  7.0    Style/Cuff Inflation:  Cuffed (inflated to minimal occlusive pressure)    Inflation Amount (mL):  7    Tube secured:  22    Secured at:  The lips    Placement Verified By:  Capnometry    Complicating Factors:  None    Findings Post-Intubation:  BS equal bilateral and atraumatic/condition of teeth unchanged

## 2024-06-20 NOTE — OPERATIVE NOTE ADDENDUM
Certification of Assistant at Surgery       Surgery Date: 6/20/2024     Participating Surgeons:  Surgeons and Role:     * Sarina Rodrigues MD - Primary    Procedures:  Procedure(s) (LRB):  LAPAROSCOPY, DIAGNOSTIC (N/A)  Exam under anesthesia (N/A)  EXCISION, LESION, VAGINA (N/A)    Assistant Surgeon's Certification of Necessity:  I understand that section 1842 (b) (6) (d) of the Social Security Act generally prohibits Medicare Part B reasonable charge payment for the services of assistants at surgery in teaching hospitals when qualified residents are available to furnish such services. I certify that the services for which payment is claimed were medically necessary, and that no qualified resident was available to perform the services. I further understand that these services are subject to post-payment review by the Medicare carrier.      LEATHA Sen    06/20/2024  2:27 PM

## 2024-06-24 LAB
FINAL PATHOLOGIC DIAGNOSIS: NORMAL
GROSS: NORMAL
Lab: NORMAL

## 2024-07-05 ENCOUNTER — OFFICE VISIT (OUTPATIENT)
Dept: OBSTETRICS AND GYNECOLOGY | Facility: CLINIC | Age: 39
End: 2024-07-05
Payer: COMMERCIAL

## 2024-07-05 VITALS
HEIGHT: 59 IN | SYSTOLIC BLOOD PRESSURE: 119 MMHG | DIASTOLIC BLOOD PRESSURE: 82 MMHG | BODY MASS INDEX: 27.24 KG/M2 | WEIGHT: 135.13 LBS

## 2024-07-05 DIAGNOSIS — N80.9 ENDOMETRIOSIS DETERMINED BY LAPAROSCOPY: Primary | ICD-10-CM

## 2024-07-05 PROCEDURE — 3074F SYST BP LT 130 MM HG: CPT | Mod: CPTII,S$GLB,, | Performed by: OBSTETRICS & GYNECOLOGY

## 2024-07-05 PROCEDURE — 99999 PR PBB SHADOW E&M-EST. PATIENT-LVL III: CPT | Mod: PBBFAC,,, | Performed by: OBSTETRICS & GYNECOLOGY

## 2024-07-05 PROCEDURE — 1159F MED LIST DOCD IN RCRD: CPT | Mod: CPTII,S$GLB,, | Performed by: OBSTETRICS & GYNECOLOGY

## 2024-07-05 PROCEDURE — 99024 POSTOP FOLLOW-UP VISIT: CPT | Mod: S$GLB,,, | Performed by: OBSTETRICS & GYNECOLOGY

## 2024-07-05 PROCEDURE — 3079F DIAST BP 80-89 MM HG: CPT | Mod: CPTII,S$GLB,, | Performed by: OBSTETRICS & GYNECOLOGY

## 2024-07-08 NOTE — PROGRESS NOTES
"   Daniella Turner is a 39 y.o. female who presents to the clinic 2 weeks status post diagnostic laparoscopy and excision of vaginal cuff mass for pelvic pain. Eating a regular diet without difficulty. Bowel movements are normal. Pain is controlled without any medications. Patient is not having vaginal bleeding. Overall she is doing well and continues to improve each day.        Objective:      /82 (BP Location: Left arm, Patient Position: Sitting)   Ht 4' 11" (1.499 m)   Wt 61.3 kg (135 lb 2.3 oz)   BMI 27.30 kg/m²   General:  alert and cooperative   Abdomen: soft, bowel sounds active, non-tender   Incision:    Vaginal cuff    healing well, no drainage, no erythema, no hernia, no seroma, no swelling, no dehiscence, incision well approximated  Intact, healing Chromic suture       Assessment:      Doing well postoperatively.  Operative findings again reviewed. Pathology report discussed.      Plan:      1. Continue any current medications.  2. Wound care discussed.  3. Activity restrictions: no restrictions  4. Would like to stay off OCP for endometriosis and will monitor her symptoms  5. Return to annual exam with her primary GYN- Iveth Marcos NP      "

## 2024-09-19 ENCOUNTER — PATIENT MESSAGE (OUTPATIENT)
Dept: PRIMARY CARE CLINIC | Facility: CLINIC | Age: 39
End: 2024-09-19
Payer: COMMERCIAL

## 2024-10-28 ENCOUNTER — HOSPITAL ENCOUNTER (OUTPATIENT)
Dept: RADIOLOGY | Facility: HOSPITAL | Age: 39
Discharge: HOME | End: 2024-10-28
Attending: FAMILY MEDICINE
Payer: COMMERCIAL

## 2024-10-28 DIAGNOSIS — Z12.31 ENCOUNTER FOR SCREENING MAMMOGRAM FOR BREAST CANCER: ICD-10-CM

## 2024-10-28 PROCEDURE — 77063 BREAST TOMOSYNTHESIS BI: CPT | Mod: 26,,, | Performed by: RADIOLOGY

## 2024-10-28 PROCEDURE — 77067 SCR MAMMO BI INCL CAD: CPT | Mod: 26,,, | Performed by: RADIOLOGY

## 2024-10-28 PROCEDURE — 77067 SCR MAMMO BI INCL CAD: CPT | Mod: TC

## 2024-10-30 ENCOUNTER — OFFICE VISIT (OUTPATIENT)
Dept: PRIMARY CARE CLINIC | Facility: CLINIC | Age: 39
End: 2024-10-30
Payer: COMMERCIAL

## 2024-10-30 VITALS
BODY MASS INDEX: 27.38 KG/M2 | DIASTOLIC BLOOD PRESSURE: 80 MMHG | OXYGEN SATURATION: 99 % | TEMPERATURE: 98 F | HEART RATE: 75 BPM | HEIGHT: 59 IN | WEIGHT: 135.81 LBS | RESPIRATION RATE: 18 BRPM | SYSTOLIC BLOOD PRESSURE: 120 MMHG

## 2024-10-30 DIAGNOSIS — F41.8 SITUATIONAL ANXIETY: Primary | ICD-10-CM

## 2024-10-30 PROCEDURE — 99214 OFFICE O/P EST MOD 30 MIN: CPT | Mod: S$GLB,,, | Performed by: FAMILY MEDICINE

## 2024-10-30 PROCEDURE — 3008F BODY MASS INDEX DOCD: CPT | Mod: CPTII,S$GLB,, | Performed by: FAMILY MEDICINE

## 2024-10-30 PROCEDURE — 1159F MED LIST DOCD IN RCRD: CPT | Mod: CPTII,S$GLB,, | Performed by: FAMILY MEDICINE

## 2024-10-30 PROCEDURE — 99999 PR PBB SHADOW E&M-EST. PATIENT-LVL IV: CPT | Mod: PBBFAC,,, | Performed by: FAMILY MEDICINE

## 2024-10-30 PROCEDURE — 3079F DIAST BP 80-89 MM HG: CPT | Mod: CPTII,S$GLB,, | Performed by: FAMILY MEDICINE

## 2024-10-30 PROCEDURE — 3074F SYST BP LT 130 MM HG: CPT | Mod: CPTII,S$GLB,, | Performed by: FAMILY MEDICINE

## 2024-10-30 PROCEDURE — 1160F RVW MEDS BY RX/DR IN RCRD: CPT | Mod: CPTII,S$GLB,, | Performed by: FAMILY MEDICINE

## 2024-10-30 RX ORDER — ALPRAZOLAM 0.5 MG/1
.25-.5 TABLET ORAL DAILY PRN
Qty: 20 TABLET | Refills: 0 | Status: SHIPPED | OUTPATIENT
Start: 2024-10-30

## 2024-11-25 ENCOUNTER — OFFICE VISIT (OUTPATIENT)
Dept: PRIMARY CARE CLINIC | Facility: CLINIC | Age: 39
End: 2024-11-25
Payer: COMMERCIAL

## 2024-11-25 ENCOUNTER — PATIENT MESSAGE (OUTPATIENT)
Dept: PRIMARY CARE CLINIC | Facility: CLINIC | Age: 39
End: 2024-11-25

## 2024-11-25 VITALS
DIASTOLIC BLOOD PRESSURE: 80 MMHG | HEIGHT: 59 IN | RESPIRATION RATE: 13 BRPM | SYSTOLIC BLOOD PRESSURE: 130 MMHG | WEIGHT: 134.38 LBS | TEMPERATURE: 98 F | OXYGEN SATURATION: 98 % | BODY MASS INDEX: 27.09 KG/M2 | HEART RATE: 83 BPM

## 2024-11-25 DIAGNOSIS — F51.01 PRIMARY INSOMNIA: ICD-10-CM

## 2024-11-25 DIAGNOSIS — F41.8 SITUATIONAL ANXIETY: ICD-10-CM

## 2024-11-25 DIAGNOSIS — R00.2 PALPITATIONS: Primary | ICD-10-CM

## 2024-11-25 PROCEDURE — 1159F MED LIST DOCD IN RCRD: CPT | Mod: CPTII,S$GLB,, | Performed by: NURSE PRACTITIONER

## 2024-11-25 PROCEDURE — 3079F DIAST BP 80-89 MM HG: CPT | Mod: CPTII,S$GLB,, | Performed by: NURSE PRACTITIONER

## 2024-11-25 PROCEDURE — 3075F SYST BP GE 130 - 139MM HG: CPT | Mod: CPTII,S$GLB,, | Performed by: NURSE PRACTITIONER

## 2024-11-25 PROCEDURE — 3008F BODY MASS INDEX DOCD: CPT | Mod: CPTII,S$GLB,, | Performed by: NURSE PRACTITIONER

## 2024-11-25 PROCEDURE — 99214 OFFICE O/P EST MOD 30 MIN: CPT | Mod: S$GLB,,, | Performed by: NURSE PRACTITIONER

## 2024-11-25 PROCEDURE — 99999 PR PBB SHADOW E&M-EST. PATIENT-LVL IV: CPT | Mod: PBBFAC,,, | Performed by: NURSE PRACTITIONER

## 2024-11-25 RX ORDER — AMITRIPTYLINE HYDROCHLORIDE 10 MG/1
10 TABLET, FILM COATED ORAL NIGHTLY PRN
Qty: 30 TABLET | Refills: 2 | Status: SHIPPED | OUTPATIENT
Start: 2024-11-25 | End: 2025-11-25

## 2024-11-25 RX ORDER — ALPRAZOLAM 0.5 MG/1
.25-.5 TABLET ORAL DAILY PRN
Qty: 20 TABLET | Refills: 1 | Status: SHIPPED | OUTPATIENT
Start: 2024-11-25

## 2024-11-25 RX ORDER — METHOCARBAMOL 500 MG/1
500 TABLET, FILM COATED ORAL 4 TIMES DAILY
Qty: 40 TABLET | Refills: 0 | Status: SHIPPED | OUTPATIENT
Start: 2024-11-25 | End: 2024-12-05

## 2024-11-25 NOTE — PROGRESS NOTES
Assessment:       1. Palpitations    2. Situational anxiety    3. Primary insomnia         Plan:       Palpitations  -     EKG 12-lead; Future    Situational anxiety  -     EKG 12-lead; Future  -     ALPRAZolam (XANAX) 0.5 MG tablet; Take 0.5-1 tablets (0.25-0.5 mg total) by mouth daily as needed for Insomnia or Anxiety.  Dispense: 20 tablet; Refill: 1    Primary insomnia  -     amitriptyline (ELAVIL) 10 MG tablet; Take 1 tablet (10 mg total) by mouth nightly as needed for Insomnia.  Dispense: 30 tablet; Refill: 2    Other orders  -     methocarbamoL (ROBAXIN) 500 MG Tab; Take 1 tablet (500 mg total) by mouth 4 (four) times daily. for 10 days  Dispense: 40 tablet; Refill: 0      Assessment & Plan    Addressed anxiety and sleep issues, considering alternatives to increasing Xanax dosage; discussed with patient that increasing the Xanax dosage was not the best option, that is Xanax will decrease ineffectiveness over time.  Needs to consider alternative medications.  Evaluated shoulder pain, likely musculoskeletal in nature  Reviewed recent negative EKG results, reassuring patient about cardiac concerns.  No acutely concerning findings on EKG.  Considered overall stress levels due to family caregiver responsibilities    GENERALIZED ANXIETY DISORDER:  - Explained anxiety is most common at night due to lack of daytime distractions.  - Explained Elavil (amitriptyline) as a sedative option for anxiety and depression.  - Shassy to consider resuming exercise routine to help manage anxiety and weight.  - Started amitriptyline (Elavil) 10 mg nightly as needed, to be taken 1 hour before bedtime.  - Continued alprazolam (Xanax) 0.5 mg at bedtime as needed for anxiety;     INSOMNIA:  - Started amitriptyline (Elavil) 10 mg nightly as needed, to be taken 1 hour before bedtime.  - Discontinued diphenhydramine (Benadryl) for sleep.    MUSCLE PAIN:  - Started methocarbamol (Robaxin) 4 times daily as needed for muscle  relaxation.    FOLLOW-UP:  - Blood pressure recheck ordered.  - Contact the office if new medication regimen is not effective for sleep or anxiety.       Medication List with Changes/Refills   New Medications    AMITRIPTYLINE (ELAVIL) 10 MG TABLET    Take 1 tablet (10 mg total) by mouth nightly as needed for Insomnia.    METHOCARBAMOL (ROBAXIN) 500 MG TAB    Take 1 tablet (500 mg total) by mouth 4 (four) times daily. for 10 days   Current Medications    AMLODIPINE (NORVASC) 5 MG TABLET    Take 1 tablet (5 mg total) by mouth once daily.    AZELASTINE (ASTELIN) 137 MCG (0.1 %) NASAL SPRAY    1 spray 2 (two) times daily.    CETIRIZINE (ZYRTEC) 10 MG TABLET    Take 10 mg by mouth once daily.    FLUTICASONE PROPIONATE (FLONASE) 50 MCG/ACTUATION NASAL SPRAY    1 spray by Each Nostril route 2 (two) times daily.    IBUPROFEN (ADVIL,MOTRIN) 800 MG TABLET    Take 1 tablet (800 mg total) by mouth every 8 (eight) hours as needed for Pain.    METOPROLOL SUCCINATE (TOPROL-XL) 25 MG 24 HR TABLET    Take 1 tablet (25 mg total) by mouth once daily.   Changed and/or Refilled Medications    Modified Medication Previous Medication    ALPRAZOLAM (XANAX) 0.5 MG TABLET ALPRAZolam (XANAX) 0.5 MG tablet       Take 0.5-1 tablets (0.25-0.5 mg total) by mouth daily as needed for Insomnia or Anxiety.    Take 0.5-1 tablets (0.25-0.5 mg total) by mouth daily as needed for Insomnia or Anxiety.   Discontinued Medications    NORETHINDRONE (MICRONOR) 0.35 MG TABLET    Take 1 tablet (0.35 mg total) by mouth once daily.         Subjective:    Patient ID: Daniella Turner is a 39 y.o. female.  Chief Complaint: Palpitations (At night) and Hypertension    HPI  History of Present Illness    CHIEF COMPLAINT:  Daniella presents today for follow-up on anxiety and sleep issues.    ANXIETY AND SLEEP:  She reports difficulty sleeping at night due to anxiety. Her current regimen includes 0.25mg of Xanax for anxiety and Benadryl for sleep, but she has recently  increased her Xanax usage due to worsening anxiety symptoms, particularly at night. She expresses concern about this increased usage and notes inconsistent efficacy, sometimes requiring doubled Xanax doses combined with Benadryl without adequate relief. She reports a lifelong history of requiring sleep aids and mentions a previous negative experience with sleep medication without specifics.    CHEST DISCOMFORT:  She experiences chest heaviness, which she attributes to anxiety rather than cardiac issues. Her recent EKG was negative, which provides her relief. She denies current cardiac symptoms and believes the beta blockers are effectively managing her heart rate, which she monitors with her Apple Watch.    SHOULDER PAIN:  She reports pain in the left scapular area, exacerbated by recent activity. The pain persists at night and feels 'bruised'. She manages with back massages from her  and has previously found relief through chiropractic care, which she's considering again. She notes a palpable knot in the affected area.    FAMILY STRESSORS:  She reports increased stress due to her mother's recent stage 2A cancer diagnosis. Her mother is scheduled to start chemotherapy with immunotherapy in December, followed by potential lung surgery in March. She has been assisting her mother with medical appointments and helping her 43-year-old sister who recently had a .    WEIGHT MANAGEMENT:  She reports regaining 35 lbs that she had previously lost.    MEDICATIONS:  She reports that the beta blocker is effectively managing her heart rate, noting a decrease in her resting heart rate from the high 90s-100s to within normal limits as checked on her Apple Watch.      ROS:  Cardiovascular: +chest pressure  Musculoskeletal: +muscle pain  Psychiatric: +anxiety, +emotional lability       Review of Systems    Objective:      Vitals:    24 1329 24 1440   BP: (!) 128/92 130/80   BP Location: Right arm Right arm  "  Patient Position: Sitting Sitting   Pulse: 83    Resp: 13    Temp: 97.5 °F (36.4 °C)    TempSrc: Temporal    SpO2: 98%    Weight: 61 kg (134 lb 5.9 oz)    Height: 4' 11" (1.499 m)      BP Readings from Last 5 Encounters:   11/25/24 130/80   10/30/24 120/80   07/05/24 119/82   06/20/24 122/76   06/14/24 123/82     Wt Readings from Last 5 Encounters:   11/25/24 61 kg (134 lb 5.9 oz)   10/30/24 61.6 kg (135 lb 12.9 oz)   07/05/24 61.3 kg (135 lb 2.3 oz)   06/20/24 61.2 kg (135 lb)   06/14/24 61.2 kg (134 lb 14.7 oz)     Physical Exam  Physical Exam    Vitals: Elevated resting heart rate: high 90s to 100s.  General: No acute distress. Well-developed. Well-nourished.  Eyes: EOMI. Sclerae anicteric.  HENT: Normocephalic. Atraumatic. Nares patent. Moist oral mucosa.  Ears: Bilateral TMs clear. Bilateral EACs clear.  Cardiovascular: Regular rate. Regular rhythm. No murmurs. No rubs. No gallops. Normal S1, S2.  Respiratory: Normal respiratory effort. Clear to auscultation bilaterally. No rales. No rhonchi. No wheezing.  Abdomen: Soft. Non-tender. Non-distended. Normoactive bowel sounds.  Musculoskeletal: No  obvious deformity.  Extremities: No lower extremity edema.  Neurological: Alert & oriented x3. No slurred speech. Normal gait.  Psychiatric: Normal mood. Normal affect. Good insight. Good judgment.  Skin: Warm. Dry. No rash.         Lab Results   Component Value Date    WBC 8.03 05/01/2024    HGB 13.4 05/01/2024    HCT 38.4 05/01/2024     05/01/2024    CHOL 258 (H) 11/07/2023    TRIG 86 11/07/2023    HDL 57 11/07/2023    ALT 11 05/01/2024    AST 30 05/01/2024     (L) 05/01/2024    K 4.9 05/01/2024     05/01/2024    CREATININE 0.7 05/01/2024    BUN 12 05/01/2024    CO2 24 05/01/2024    TSH 5.840 (H) 02/28/2024    HGBA1C 4.6 11/07/2023      This note was generated with the assistance of ambient listening technology. Verbal consent was obtained by the patient and accompanying visitor(s) for the " recording of patient appointment to facilitate this note. I attest to having reviewed and edited the generated note for accuracy, though some syntax or spelling errors may persist. Please contact the author of this note for any clarification.

## 2024-11-26 LAB
OHS QRS DURATION: 88 MS
OHS QTC CALCULATION: 417 MS

## 2025-01-08 RX ORDER — MECLIZINE HYDROCHLORIDE 25 MG/1
25 TABLET ORAL 3 TIMES DAILY PRN
Qty: 15 TABLET | Refills: 0 | Status: SHIPPED | OUTPATIENT
Start: 2025-01-08 | End: 2025-01-13

## 2025-01-08 RX ORDER — ONDANSETRON 4 MG/1
4 TABLET, ORALLY DISINTEGRATING ORAL EVERY 8 HOURS PRN
Qty: 15 TABLET | Refills: 0 | Status: SHIPPED | OUTPATIENT
Start: 2025-01-08 | End: 2025-01-13

## 2025-01-17 ENCOUNTER — OFFICE VISIT (OUTPATIENT)
Dept: PRIMARY CARE CLINIC | Facility: CLINIC | Age: 40
End: 2025-01-17
Payer: COMMERCIAL

## 2025-01-17 DIAGNOSIS — F41.8 SITUATIONAL ANXIETY: ICD-10-CM

## 2025-01-17 PROCEDURE — 98004 SYNCH AUDIO-VIDEO EST SF 10: CPT | Mod: 95,,, | Performed by: NURSE PRACTITIONER

## 2025-01-17 RX ORDER — ALPRAZOLAM 0.5 MG/1
.25-.5 TABLET ORAL DAILY PRN
Qty: 30 TABLET | Refills: 2 | Status: SHIPPED | OUTPATIENT
Start: 2025-01-17

## 2025-01-17 NOTE — PROGRESS NOTES
Chief Complaint  Chief Complaint   Patient presents with    Anxiety       The patient location is: [place]  The chief complaint leading to consultation is: medication refill    Visit type: audiovisual    Face to Face time with patient: 10  10 minutes of total time spent on10 the encounter, which includes face to face time and non-face to face time preparing to see the patient (eg, review of tests), Obtaining and/or reviewing separately obtained history, Documenting clinical information in the electronic or other health record, Independently interpreting results (not separately reported) and communicating results to the patient/family/caregiver, or Care coordination (not separately reported).         Each patient to whom he or she provides medical services by telemedicine is:  (1) informed of the relationship between the physician and patient and the respective role of any other health care provider with respect to management of the patient; and (2) notified that he or she may decline to receive medical services by telemedicine and may withdraw from such care at any time.    Notes:    SARAH Turner is a 39 y.o. female that presents for medication refill.     Patient currently on anxiety medication only as needed for situational anxiety. Takes xanax 1/2 to 1 tablet only PRN. Generally feeling well. Requesting refill at this time.l  checked. Not filling routinely.          PAST MEDICAL HISTORY:  Past Medical History:   Diagnosis Date    Anxiety     Hypertension     PCOS (polycystic ovarian syndrome)        PAST SURGICAL HISTORY:  Past Surgical History:   Procedure Laterality Date    DIAGNOSTIC LAPAROSCOPY N/A 6/20/2024    Procedure: LAPAROSCOPY, DIAGNOSTIC;  Surgeon: Sarina Rodrigues MD;  [facility];  Service: OB/GYN;  Laterality: N/A;  Planned Laparoscopic, Possible robotic excision endometriosis    EXAMINATION UNDER ANESTHESIA N/A 6/20/2024    Procedure: Exam under anesthesia;  Surgeon: Ravi  MD Sarina;  Location: [facility] OR;  Service: OB/GYN;  Laterality: N/A;    EXCISION OF LESION OF VAGINA N/A 6/20/2024    Procedure: EXCISION, LESION, VAGINA;  Surgeon: Sarina Rodrigues MD;  Location: [facility] OR;  Service: OB/GYN;  Laterality: N/A;    HYSTERECTOMY      NASAL SEPTUM SURGERY      TONSILLECTOMY         SOCIAL HISTORY:  Social History     Socioeconomic History    Marital status:    Tobacco Use    Smoking status: Every Day     Types: Vaping with nicotine    Smokeless tobacco: Never   Substance and Sexual Activity    Alcohol use: Yes     Comment: occasionaly    Drug use: Never    Sexual activity: Yes       FAMILY HISTORY:  Family History   Problem Relation Name Age of Onset    Hypertension Mother      Graves' disease Mother      Thyroid disease Mother      Hypertension Father      Hypertension Sister      No Known Problems Daughter      Heart disease Maternal Grandmother      Hypertension Maternal Grandmother      Hypertension Maternal Grandfather lung cancer     Cancer Maternal Grandfather lung cancer     Hypertension Paternal Grandmother      Heart disease Paternal Grandmother      Hypertension Paternal Grandfather colon cancer     Cancer Paternal Grandfather colon cancer        ALLERGIES AND MEDICATIONS: updated and reviewed.  Review of patient's allergies indicates:   Allergen Reactions    Ace inhibitors      cough     Current Outpatient Medications   Medication Sig Dispense Refill    ALPRAZolam (XANAX) 0.5 MG tablet Take 0.5-1 tablets (0.25-0.5 mg total) by mouth daily as needed for Insomnia or Anxiety. 30 tablet 2    amitriptyline (ELAVIL) 10 MG tablet Take 1 tablet (10 mg total) by mouth nightly as needed for Insomnia. 30 tablet 2    amLODIPine (NORVASC) 5 MG tablet Take 1 tablet (5 mg total) by mouth once daily. 30 tablet 11    azelastine (ASTELIN) 137 mcg (0.1 %) nasal spray 1 spray 2 (two) times daily.      cetirizine (ZYRTEC) 10 MG tablet Take 10 mg by mouth once daily.      fluticasone  propionate (FLONASE) 50 mcg/actuation nasal spray 1 spray by Each Nostril route 2 (two) times daily.      ibuprofen (ADVIL,MOTRIN) 800 MG tablet Take 1 tablet (800 mg total) by mouth every 8 (eight) hours as needed for Pain. 15 tablet 0    meclizine (ANTIVERT) 25 mg tablet Take 1 tablet (25 mg total) by mouth 3 (three) times daily as needed for Dizziness. 15 tablet 0    metoprolol succinate (TOPROL-XL) 25 MG 24 hr tablet Take 1 tablet (25 mg total) by mouth once daily. 90 tablet 3     No current facility-administered medications for this visit.         ROS  Review of Systems   Constitutional:  Negative for chills and fever.   HENT:  Negative for ear pain, nosebleeds, sinus pressure and sore throat.    Respiratory:  Negative for cough and shortness of breath.    Cardiovascular:  Negative for chest pain and palpitations.   Gastrointestinal:  Negative for diarrhea, nausea and vomiting.   Genitourinary: Negative.    Psychiatric/Behavioral:  Negative for dysphoric mood and sleep disturbance. The patient is nervous/anxious.            PHYSICAL EXAM    Physical Exam  Constitutional:       General: She is not in acute distress.     Appearance: Normal appearance. She is obese. She is not ill-appearing.   HENT:      Head: Normocephalic.      Mouth/Throat:      Mouth: Mucous membranes are moist.      Pharynx: No pharyngeal swelling or oropharyngeal exudate.      Tonsils: No tonsillar exudate.   Eyes:      Conjunctiva/sclera:      Right eye: Right conjunctiva is not injected.      Left eye: Left conjunctiva is not injected.   Cardiovascular:      Rate and Rhythm: Normal rate and regular rhythm.      Pulses:           Radial pulses are 1+ on the right side and 1+ on the left side.      Heart sounds: No murmur heard.     No systolic murmur is present.      No diastolic murmur is present.   Pulmonary:      Effort: Pulmonary effort is normal. No tachypnea or bradypnea.      Breath sounds: Normal breath sounds. No decreased breath  sounds, wheezing, rhonchi or rales.   Abdominal:      General: There is no distension.   Musculoskeletal:      Right lower leg: No edema.      Left lower leg: No edema.   Skin:     General: Skin is warm and dry.   Neurological:      Mental Status: She is alert.   Psychiatric:         Attention and Perception: Attention normal.         Mood and Affect: Mood normal.         Speech: Speech normal.         Behavior: Behavior normal.         Thought Content: Thought content normal.         Judgment: Judgment normal.           Health Maintenance         Date Due Completion Date    Pneumococcal Vaccines (Age 0-49) (1 of 2 - PCV) Never done ---    Mammogram 10/28/2025 10/28/2024    Hemoglobin A1c (Diabetic Prevention Screening) 11/07/2026 11/7/2023    TETANUS VACCINE 05/02/2032 5/2/2022    RSV Vaccine (Age 60+ and Pregnant patients) (1 - 1-dose 75+ series) 02/01/2059 ---              Assessment & Plan    Problem List Items Addressed This Visit          Unprioritized    Situational anxiety    Relevant Medications    ALPRAZolam (XANAX) 0.5 MG tablet       Follow-up: Follow up if symptoms worsen or fail to improve.    Iveth Castelan    Medication List with Changes/Refills   Current Medications    AMITRIPTYLINE (ELAVIL) 10 MG TABLET    Take 1 tablet (10 mg total) by mouth nightly as needed for Insomnia.    AMLODIPINE (NORVASC) 5 MG TABLET    Take 1 tablet (5 mg total) by mouth once daily.    AZELASTINE (ASTELIN) 137 MCG (0.1 %) NASAL SPRAY    1 spray 2 (two) times daily.    CETIRIZINE (ZYRTEC) 10 MG TABLET    Take 10 mg by mouth once daily.    FLUTICASONE PROPIONATE (FLONASE) 50 MCG/ACTUATION NASAL SPRAY    1 spray by Each Nostril route 2 (two) times daily.    IBUPROFEN (ADVIL,MOTRIN) 800 MG TABLET    Take 1 tablet (800 mg total) by mouth every 8 (eight) hours as needed for Pain.    MECLIZINE (ANTIVERT) 25 MG TABLET    Take 1 tablet (25 mg total) by mouth 3 (three) times daily as needed for Dizziness.    METOPROLOL SUCCINATE  (TOPROL-XL) 25 MG 24 HR TABLET    Take 1 tablet (25 mg total) by mouth once daily.   Changed and/or Refilled Medications    Modified Medication Previous Medication    ALPRAZOLAM (XANAX) 0.5 MG TABLET ALPRAZolam (XANAX) 0.5 MG tablet       Take 0.5-1 tablets (0.25-0.5 mg total) by mouth daily as needed for Insomnia or Anxiety.    Take 0.5-1 tablets (0.25-0.5 mg total) by mouth daily as needed for Insomnia or Anxiety.

## 2025-02-17 RX ORDER — AMLODIPINE BESYLATE 5 MG/1
5 TABLET ORAL
Qty: 90 TABLET | Refills: 3 | Status: SHIPPED | OUTPATIENT
Start: 2025-02-17

## 2025-02-17 NOTE — TELEPHONE ENCOUNTER
Refill Routing Note   Medication(s) are not appropriate for processing by [facility] for the following reason(s):        Non-participating provider    [facility] action(s):  Route             Appointments  past 12m or future 3m with PCP    Date Provider   Last Visit   1/17/2025 Iveth Castelan NP   Next Visit   Visit date not found Iveth Castelan NP   ED visits in past 90 days: 0        Note composed:1:23 PM 02/17/2025

## 2025-02-19 DIAGNOSIS — F51.01 PRIMARY INSOMNIA: ICD-10-CM

## 2025-02-19 RX ORDER — AMITRIPTYLINE HYDROCHLORIDE 10 MG/1
10 TABLET, FILM COATED ORAL NIGHTLY
Qty: 90 TABLET | Refills: 1 | Status: SHIPPED | OUTPATIENT
Start: 2025-02-19

## 2025-02-19 NOTE — TELEPHONE ENCOUNTER
Refill Routing Note   Medication(s) are not appropriate for processing by [facility] for the following reason(s):        Outside of protocol  Elavil is prescribed for as needed use    [facility] action(s):  Route               Appointments  past 12m or future 3m with PCP    Date Provider   Last Visit   10/30/2024 Ronny Mims MD   Next Visit   Visit date not found Ronny Mims MD   ED visits in past 90 days: 0        Note composed:8:56 AM 02/19/2025

## 2025-02-21 DIAGNOSIS — I10 ESSENTIAL HYPERTENSION, BENIGN: ICD-10-CM

## 2025-02-21 DIAGNOSIS — R00.0 CHRONIC TACHYCARDIA: ICD-10-CM

## 2025-02-21 RX ORDER — METOPROLOL SUCCINATE 25 MG/1
25 TABLET, EXTENDED RELEASE ORAL
Qty: 90 TABLET | Refills: 2 | Status: SHIPPED | OUTPATIENT
Start: 2025-02-21

## 2025-02-21 NOTE — TELEPHONE ENCOUNTER
Refill Decision Note   Cristóbalstephanie Johnny  is requesting a refill authorization.  Brief Assessment and Rationale for Refill:  Approve     Medication Therapy Plan:         Comments:     Note composed:4:59 PM 02/21/2025

## 2025-02-21 NOTE — TELEPHONE ENCOUNTER
No care due was identified.  Health [facility] Embedded Care Due Messages. Reference number: [id number].   2/21/2025 4:13:38 PM CST

## 2025-05-22 DIAGNOSIS — F41.8 SITUATIONAL ANXIETY: ICD-10-CM

## 2025-05-22 RX ORDER — ALPRAZOLAM 0.5 MG/1
.25-.5 TABLET ORAL DAILY PRN
Qty: 30 TABLET | Refills: 1 | Status: SHIPPED | OUTPATIENT
Start: 2025-05-22

## 2025-06-04 ENCOUNTER — OFFICE VISIT (OUTPATIENT)
Dept: PRIMARY CARE CLINIC | Facility: CLINIC | Age: 40
End: 2025-06-04
Payer: COMMERCIAL

## 2025-06-04 VITALS
HEIGHT: 59 IN | BODY MASS INDEX: 23.02 KG/M2 | SYSTOLIC BLOOD PRESSURE: 122 MMHG | RESPIRATION RATE: 14 BRPM | TEMPERATURE: 98 F | HEART RATE: 86 BPM | OXYGEN SATURATION: 99 % | WEIGHT: 114.19 LBS | DIASTOLIC BLOOD PRESSURE: 80 MMHG

## 2025-06-04 DIAGNOSIS — L71.9 ROSACEA: ICD-10-CM

## 2025-06-04 DIAGNOSIS — Z00.00 ANNUAL PHYSICAL EXAM: Primary | ICD-10-CM

## 2025-06-04 DIAGNOSIS — F41.8 SITUATIONAL ANXIETY: ICD-10-CM

## 2025-06-04 DIAGNOSIS — L20.84 INTRINSIC ECZEMA: ICD-10-CM

## 2025-06-04 DIAGNOSIS — L65.9 HAIR LOSS: ICD-10-CM

## 2025-06-04 DIAGNOSIS — R00.0 CHRONIC TACHYCARDIA: ICD-10-CM

## 2025-06-04 DIAGNOSIS — I10 ESSENTIAL HYPERTENSION, BENIGN: ICD-10-CM

## 2025-06-04 PROCEDURE — 3008F BODY MASS INDEX DOCD: CPT | Mod: CPTII,S$GLB,, | Performed by: NURSE PRACTITIONER

## 2025-06-04 PROCEDURE — 99396 PREV VISIT EST AGE 40-64: CPT | Mod: S$GLB,,, | Performed by: NURSE PRACTITIONER

## 2025-06-04 PROCEDURE — 3074F SYST BP LT 130 MM HG: CPT | Mod: CPTII,S$GLB,, | Performed by: NURSE PRACTITIONER

## 2025-06-04 PROCEDURE — 1159F MED LIST DOCD IN RCRD: CPT | Mod: CPTII,S$GLB,, | Performed by: NURSE PRACTITIONER

## 2025-06-04 PROCEDURE — 99999 PR PBB SHADOW E&M-EST. PATIENT-LVL III: CPT | Mod: PBBFAC,,, | Performed by: NURSE PRACTITIONER

## 2025-06-04 PROCEDURE — 3079F DIAST BP 80-89 MM HG: CPT | Mod: CPTII,S$GLB,, | Performed by: NURSE PRACTITIONER

## 2025-06-04 RX ORDER — ONDANSETRON 4 MG/1
4 TABLET, ORALLY DISINTEGRATING ORAL 2 TIMES DAILY
Qty: 30 TABLET | Refills: 2 | Status: SHIPPED | OUTPATIENT
Start: 2025-06-04

## 2025-06-04 RX ORDER — MECLIZINE HYDROCHLORIDE 25 MG/1
25 TABLET ORAL 3 TIMES DAILY PRN
Qty: 15 TABLET | Refills: 0 | Status: SHIPPED | OUTPATIENT
Start: 2025-06-04 | End: 2025-06-09

## 2025-06-04 RX ORDER — METOPROLOL SUCCINATE 25 MG/1
25 TABLET, EXTENDED RELEASE ORAL DAILY
Qty: 90 TABLET | Refills: 3 | Status: SHIPPED | OUTPATIENT
Start: 2025-06-04

## 2025-06-04 RX ORDER — AMLODIPINE BESYLATE 5 MG/1
5 TABLET ORAL DAILY
Qty: 90 TABLET | Refills: 3 | Status: SHIPPED | OUTPATIENT
Start: 2025-06-04

## 2025-06-04 RX ORDER — TRIAMCINOLONE ACETONIDE 1 MG/G
CREAM TOPICAL 2 TIMES DAILY
Qty: 80 G | Refills: 2 | Status: SHIPPED | OUTPATIENT
Start: 2025-06-04

## 2025-06-04 RX ORDER — ALPRAZOLAM 0.5 MG/1
.25-.5 TABLET ORAL DAILY PRN
Qty: 30 TABLET | Refills: 5 | Status: SHIPPED | OUTPATIENT
Start: 2025-06-04

## 2025-06-04 RX ORDER — IVERMECTIN 10 MG/G
1 CREAM TOPICAL DAILY
Qty: 45 G | Refills: 2 | Status: SHIPPED | OUTPATIENT
Start: 2025-06-04

## 2025-06-10 ENCOUNTER — RESULTS FOLLOW-UP (OUTPATIENT)
Dept: PRIMARY CARE CLINIC | Facility: CLINIC | Age: 40
End: 2025-06-10

## 2025-07-21 ENCOUNTER — OFFICE VISIT (OUTPATIENT)
Dept: PRIMARY CARE CLINIC | Facility: CLINIC | Age: 40
End: 2025-07-21
Payer: COMMERCIAL

## 2025-07-21 DIAGNOSIS — F17.200 VAPING NICOTINE DEPENDENCE, NON-TOBACCO PRODUCT: ICD-10-CM

## 2025-07-21 DIAGNOSIS — N64.4 BREAST PAIN, RIGHT: Primary | ICD-10-CM

## 2025-07-21 PROCEDURE — 3044F HG A1C LEVEL LT 7.0%: CPT | Mod: CPTII,95,, | Performed by: NURSE PRACTITIONER

## 2025-07-21 PROCEDURE — 98005 SYNCH AUDIO-VIDEO EST LOW 20: CPT | Mod: 95,,, | Performed by: NURSE PRACTITIONER

## 2025-07-21 NOTE — PROGRESS NOTES
Assessment:       1. Breast pain, right    2. Vaping nicotine dependence, non-tobacco product         Plan:       Breast pain, right  -     Mammo Digital Diagnostic Right; Future; Expected date: 07/21/2025  -     US Breast Right Complete; Future; Expected date: 07/21/2025    Vaping nicotine dependence, non-tobacco product    Last mammo October, normal, risk 6%. Diag mammo and US ordered. Reassurance provided.     Assessment & Plan    IMPRESSION:  - Assessed complaint of painful lump in right breast, noting location, size, and mobility.  - Considered differential diagnoses including cyst and mastitis.  - Determined need for diagnostic imaging to evaluate lump.  - Last mammogram in October was normal, with low breast cancer risk (6.5) and dense breasts.    UNSPECIFIED LUMP IN RIGHT BREAST:   Daniella reports a painful lump under the right breast discovered last week, measuring between a nickel and quarter in size. Not increasing in size. Noted tender axillary lymph nodes.    The lump is mobile within tissues with no discoloration or dimpling.   Pain sometimes radiates through the breast.   Ordered diagnostic mammogram and ultrasound of right breast and referred patient to [facility] for diagnostic imaging.      OTHER SPECIFIED DISORDERS OF BREAST:   Daniella's breast cancer risk evaluated at 6.5, which is considered low.   Dense breast tissue noted, with previous mammogram being completely normal.   Explained that breast cancer generally does not cause pain and discussed limitations of antibiotic treatment for cysts due to lack of vascularity.   Advised patient to contact the office if results are not provided at time of imaging.    PERSONAL HISTORY OF OTHER BENIGN NEOPLASM:   Daniella had previous benign lumps in each breast found during first mammogram.       Medication List with Changes/Refills   Current Medications    ALPRAZOLAM (XANAX) 0.5 MG TABLET    Take 0.5-1 tablets (0.25-0.5 mg total) by mouth daily as  needed for Insomnia or Anxiety.    AMLODIPINE (NORVASC) 5 MG TABLET    Take 1 tablet (5 mg total) by mouth once daily.    AZELASTINE (ASTELIN) 137 MCG (0.1 %) NASAL SPRAY    1 spray 2 (two) times daily.    CETIRIZINE (ZYRTEC) 10 MG TABLET    Take 10 mg by mouth once daily.    FLUTICASONE PROPIONATE (FLONASE) 50 MCG/ACTUATION NASAL SPRAY    1 spray by Each Nostril route 2 (two) times daily.    IBUPROFEN (ADVIL,MOTRIN) 800 MG TABLET    Take 1 tablet (800 mg total) by mouth every 8 (eight) hours as needed for Pain.    IVERMECTIN (SOOLANTRA) 1 % CREA    Apply 1 Application topically once daily.    MECLIZINE (ANTIVERT) 25 MG TABLET    Take 1 tablet (25 mg total) by mouth 3 (three) times daily as needed for Dizziness.    METOPROLOL SUCCINATE (TOPROL-XL) 25 MG 24 HR TABLET    Take 1 tablet (25 mg total) by mouth once daily.    ONDANSETRON (ZOFRAN-ODT) 4 MG TBDL    Take 1 tablet (4 mg total) by mouth 2 (two) times daily.    TRIAMCINOLONE ACETONIDE 0.1% (KENALOG) 0.1 % CREAM    Apply topically 2 (two) times daily.         Subjective:    Patient ID: Daniella Turner is a 40 y.o. female.    The patient location is: [place]  The chief complaint leading to consultation is: breast mass    Visit type: audiovisual    Face to Face time with patient: 12  12 minutes of total time spent on the encounter, which includes face to face time and non-face to face time preparing to see the patient (eg, review of tests), Obtaining and/or reviewing separately obtained history, Documenting clinical information in the electronic or other health record, Independently interpreting results (not separately reported) and communicating results to the patient/family/caregiver, or Care coordination (not separately reported).         Each patient to whom he or she provides medical services by telemedicine is:  (1) informed of the relationship between the physician and patient and the respective role of any other health care provider with respect to  management of the patient; and (2) notified that he or she may decline to receive medical services by telemedicine and may withdraw from such care at any time.    Notes:     Chief Complaint: No chief complaint on file.    HPI  History of Present Illness    CHIEF COMPLAINT:  Daniella presents today for a right breast lump with pain.    HISTORY OF PRESENT ILLNESS:  She reports a right breast mass located inferiorly at the inframammary fold, first noticed last week. The mass is described as mobile, round, and measuring between nickel to quarter-sized. She experiences intermittent discomfort that occurs with movement or touch, sometimes radiating through the breast. Pain is variable and primarily triggered by palpation or specific movements. She denies skin changes, increased warmth, or observed increase in mass size. No associated systemic symptoms reported.    BREAST HISTORY:  Her last mammogram in October was normal, though she has dense breast tissue. She has a history of bilateral benign breast lumps discovered during her first mammogram, confirmed by ultrasound. Her current breast cancer risk is calculated at 6.5, which is considered low.       Review of Systems   Constitutional:  Negative for chills and fever.   HENT:  Negative for ear pain, nosebleeds, sinus pressure and sore throat.    Respiratory:  Negative for cough and shortness of breath.    Cardiovascular:  Positive for chest pain (breast pain). Negative for palpitations.   Gastrointestinal:  Negative for diarrhea, nausea and vomiting.   Genitourinary: Negative.    Psychiatric/Behavioral:  Negative for dysphoric mood and sleep disturbance. The patient is not nervous/anxious.        Objective:      There were no vitals filed for this visit.  BP Readings from Last 5 Encounters:   06/04/25 122/80   11/25/24 130/80   10/30/24 120/80   07/05/24 119/82   06/20/24 122/76     Wt Readings from Last 5 Encounters:   06/04/25 51.8 kg (114 lb 3.2 oz)   11/25/24 61 kg  (134 lb 5.9 oz)   10/30/24 61.6 kg (135 lb 12.9 oz)   07/05/24 61.3 kg (135 lb 2.3 oz)   06/20/24 61.2 kg (135 lb)     Physical Exam  Constitutional:       General: She is not in acute distress.     Appearance: She is obese. She is not ill-appearing.   HENT:      Head: Normocephalic.   Pulmonary:      Effort: Pulmonary effort is normal.   Chest:   Breasts:     Right: Mass and tenderness present. No nipple discharge or skin change.          Comments: Describes area of tenderness as nodule, mobile, approximately nickel sized with no increase in size. Describes axillary pain and tenderness.   Neurological:      Mental Status: She is alert.   Psychiatric:         Mood and Affect: Mood normal.         Behavior: Behavior normal.         Thought Content: Thought content normal.         Judgment: Judgment normal.       Physical Exam              Lab Results   Component Value Date    WBC 6.27 06/09/2025    HGB 13.6 06/09/2025    HCT 38.9 06/09/2025     06/09/2025    CHOL 212 (H) 06/09/2025    TRIG 84 06/09/2025    HDL 55 06/09/2025    ALT 10 06/09/2025    AST 21 06/09/2025     06/09/2025    K 3.7 06/09/2025     06/09/2025    CREATININE 0.7 06/09/2025    BUN 9 06/09/2025    CO2 27 06/09/2025    TSH 5.516 (H) 06/09/2025    HGBA1C 4.6 06/09/2025      This note was generated with the assistance of ambient listening technology. Verbal consent was obtained by the patient and accompanying visitor(s) for the recording of patient appointment to facilitate this note. I attest to having reviewed and edited the generated note for accuracy, though some syntax or spelling errors may persist. Please contact the author of this note for any clarification.

## 2025-07-29 ENCOUNTER — HOSPITAL ENCOUNTER (OUTPATIENT)
Dept: RADIOLOGY | Facility: HOSPITAL | Age: 40
Discharge: HOME | End: 2025-07-29
Attending: NURSE PRACTITIONER
Payer: COMMERCIAL

## 2025-07-29 DIAGNOSIS — N64.4 BREAST PAIN, RIGHT: ICD-10-CM

## 2025-07-29 PROCEDURE — 77061 BREAST TOMOSYNTHESIS UNI: CPT | Mod: 26,RT,, | Performed by: RADIOLOGY

## 2025-07-29 PROCEDURE — 76642 ULTRASOUND BREAST LIMITED: CPT | Mod: TC,PO,RT

## 2025-07-29 PROCEDURE — 76642 ULTRASOUND BREAST LIMITED: CPT | Mod: 26,RT,, | Performed by: RADIOLOGY

## 2025-07-29 PROCEDURE — 77065 DX MAMMO INCL CAD UNI: CPT | Mod: 26,RT,, | Performed by: RADIOLOGY

## 2025-07-29 PROCEDURE — 77061 BREAST TOMOSYNTHESIS UNI: CPT | Mod: TC,PO,RT

## (undated) DEVICE — SEAL LENS SCOPE MYOSURE

## (undated) DEVICE — PACK FLUENT DISPOSABLE

## (undated) DEVICE — SOL BETADINE 5%

## (undated) DEVICE — SET BASIN 48X48IN 6000ML RING

## (undated) DEVICE — SOL IRR NACL .9% 3000ML

## (undated) DEVICE — SUT MCRYL PLUS 4-0 PS2 27IN

## (undated) DEVICE — PENCIL ELECTROSURG HOLST W/BLD

## (undated) DEVICE — SOL POVIDONE SCRUB IODINE 4 OZ

## (undated) DEVICE — SCISSOR 5MMX35CM DIRECT DRIVE

## (undated) DEVICE — PAD PINK TRENDELENBURG POS XL

## (undated) DEVICE — DEVICE ABLATION NOVASURE DISP

## (undated) DEVICE — SUT VICRYL+ 27 UR-6 VIOL

## (undated) DEVICE — CLIPPER BLADE MOD 4406 (CAREF)

## (undated) DEVICE — SOL NORMAL USPCA 0.9%

## (undated) DEVICE — CANNULA ENDOPATH XCEL 5X100MM

## (undated) DEVICE — Device

## (undated) DEVICE — IRRIGATOR ENDOSCOPY DISP.

## (undated) DEVICE — SOL POVIDONE PREP IODINE 4 OZ

## (undated) DEVICE — GLOVE BIOGEL SKINSENSE PI 6.5

## (undated) DEVICE — ELECTRODE REM PLYHSV RETURN 9

## (undated) DEVICE — PACK LAPAROSCOPY BAPTIST

## (undated) DEVICE — TROCAR ENDOPATH XCEL 5X100MM

## (undated) DEVICE — JELLY SURGILUBE 5GR

## (undated) DEVICE — SOL IRR SOD CHL .9% POUR

## (undated) DEVICE — SYR 10CC LUER LOCK

## (undated) DEVICE — NDL INSUFFLATION VERRES 120MM